# Patient Record
Sex: FEMALE | Race: ASIAN | NOT HISPANIC OR LATINO | Employment: FULL TIME | URBAN - METROPOLITAN AREA
[De-identification: names, ages, dates, MRNs, and addresses within clinical notes are randomized per-mention and may not be internally consistent; named-entity substitution may affect disease eponyms.]

---

## 2017-11-28 ENCOUNTER — TELEPHONE (OUTPATIENT)
Dept: SURGERY | Facility: CLINIC | Age: 47
End: 2017-11-28

## 2017-11-28 NOTE — TELEPHONE ENCOUNTER
"----- Message from Salome Bates LPN sent at 11/28/2017  9:49 AM CST -----      ----- Message -----  From: Vishnu Day  Sent: 11/28/2017   9:45 AM  To: Salome Bates LPN    Please schedule with General Surgery, External Hemorrhoids, Residual Hemorrhoidal Skin Tags.  11/28/2017 9:55  Left message for patient to call clinic.    11/28/2017 @ 4:12 PM  Spoke with patient she don't have insurance right know. "Patient stated She will have to wait until January 2018."  I told patient I would send her a letter to remind her to call after she gets her new insurance card.    "

## 2018-06-25 ENCOUNTER — HOSPITAL ENCOUNTER (OUTPATIENT)
Dept: RADIOLOGY | Facility: HOSPITAL | Age: 48
Discharge: HOME | End: 2018-06-25
Attending: FAMILY MEDICINE
Payer: COMMERCIAL

## 2018-06-25 ENCOUNTER — OFFICE VISIT (OUTPATIENT)
Dept: FAMILY MEDICINE | Facility: CLINIC | Age: 48
End: 2018-06-25
Payer: COMMERCIAL

## 2018-06-25 VITALS
TEMPERATURE: 98 F | HEIGHT: 64 IN | OXYGEN SATURATION: 98 % | WEIGHT: 152.56 LBS | DIASTOLIC BLOOD PRESSURE: 72 MMHG | HEART RATE: 63 BPM | BODY MASS INDEX: 26.05 KG/M2 | SYSTOLIC BLOOD PRESSURE: 108 MMHG

## 2018-06-25 DIAGNOSIS — M54.50 CHRONIC LOW BACK PAIN WITHOUT SCIATICA, UNSPECIFIED BACK PAIN LATERALITY: ICD-10-CM

## 2018-06-25 DIAGNOSIS — G89.29 CHRONIC LOW BACK PAIN WITHOUT SCIATICA, UNSPECIFIED BACK PAIN LATERALITY: ICD-10-CM

## 2018-06-25 DIAGNOSIS — Z91.09 ENVIRONMENTAL ALLERGIES: ICD-10-CM

## 2018-06-25 DIAGNOSIS — Z23 NEED FOR DIPHTHERIA-TETANUS-PERTUSSIS (TDAP) VACCINE: ICD-10-CM

## 2018-06-25 DIAGNOSIS — Z11.3 ROUTINE SCREENING FOR STI (SEXUALLY TRANSMITTED INFECTION): ICD-10-CM

## 2018-06-25 DIAGNOSIS — Z12.4 CERVICAL CANCER SCREENING: ICD-10-CM

## 2018-06-25 DIAGNOSIS — Z87.59 HISTORY OF ECTOPIC PREGNANCY: ICD-10-CM

## 2018-06-25 DIAGNOSIS — Z01.419 WELL WOMAN EXAM: Primary | ICD-10-CM

## 2018-06-25 DIAGNOSIS — Z98.890 HISTORY OF THYROID SURGERY: ICD-10-CM

## 2018-06-25 DIAGNOSIS — Z12.39 BREAST CANCER SCREENING: ICD-10-CM

## 2018-06-25 PROCEDURE — 99999 PR PBB SHADOW E&M-EST. PATIENT-LVL V: CPT | Mod: PBBFAC,,, | Performed by: FAMILY MEDICINE

## 2018-06-25 PROCEDURE — 72110 X-RAY EXAM L-2 SPINE 4/>VWS: CPT | Mod: TC,FY,PO

## 2018-06-25 PROCEDURE — 72110 X-RAY EXAM L-2 SPINE 4/>VWS: CPT | Mod: 26,,, | Performed by: RADIOLOGY

## 2018-06-25 PROCEDURE — 90471 IMMUNIZATION ADMIN: CPT | Mod: S$GLB,,, | Performed by: FAMILY MEDICINE

## 2018-06-25 PROCEDURE — 99386 PREV VISIT NEW AGE 40-64: CPT | Mod: 25,S$GLB,, | Performed by: FAMILY MEDICINE

## 2018-06-25 PROCEDURE — 90715 TDAP VACCINE 7 YRS/> IM: CPT | Mod: S$GLB,,, | Performed by: FAMILY MEDICINE

## 2018-06-25 RX ORDER — FLUTICASONE PROPIONATE 50 MCG
1 SPRAY, SUSPENSION (ML) NASAL 2 TIMES DAILY
Qty: 1 BOTTLE | Refills: 11 | Status: SHIPPED | OUTPATIENT
Start: 2018-06-25 | End: 2018-07-25

## 2018-06-25 RX ORDER — CETIRIZINE HYDROCHLORIDE 10 MG/1
10 TABLET ORAL DAILY
Refills: 0 | COMMUNITY
Start: 2018-06-25 | End: 2019-07-10

## 2018-06-25 NOTE — PATIENT INSTRUCTIONS
Tylenol 500 mg TID for pain and/or ibuprofen 400 mg TID with food  Icing  PT  XR to rule out bony issues      Low-Salt Diet  This diet removes foods that are high in salt. It also limits the amount of salt you use when cooking. It is most often used for people with high blood pressure, edema (fluid retention), and kidney, liver, or heart disease.  Table salt contains the mineral sodium. Your body needs sodium to work normally. But too much sodium can make your health problems worse. Your healthcare provider is recommending a low-salt (also called low-sodium) diet for you. Your total daily allowance of salt is 1,500 to 2,300 milligrams (mg). It is less than 1 teaspoon of table salt. This means you can have only about 500 to 700 mg of sodium at each meal. People with certain health problems should limit salt intake to the lower end of the recommended range.    When you cook, dont add much salt. If you can cook without using salt, even better. Dont add salt to your food at the table.  When shopping, read food labels. Salt is often called sodium on the label. Choose foods that are salt-free, low salt, or very low salt. Note that foods with reduced salt may not lower your salt intake enough.    Beans, potatoes, and pasta  Ok: Dry beans, split peas, lentils, potatoes, rice, macaroni, pasta, spaghetti without added salt  Avoid: Potato chips, tortilla chips, and similar products  Breads and cereals  Ok: Low-sodium breads, rolls, cereals, and cakes; low-salt crackers, matzo crackers  Avoid: Salted crackers, pretzels, popcorn, Serbian toast, pancakes, muffins  Dairy  Ok: Milk, chocolate milk, hot chocolate mix, low-salt cheeses, and yogurt  Avoid: Processed cheese and cheese spreads; Roquefort, Camembert, and cottage cheese; buttermilk, instant breakfast drink  Desserts  Ok: Ice cream, frozen yogurt, juice bars, gelatin, cookies and pies, sugar, honey, jelly, hard candy  Avoid: Most pies, cakes and cookies prepared or  processed with salt; instant pudding  Drinks  Ok: Tea, coffee, fizzy (carbonated) drinks, juices  Avoid: Flavored coffees, electrolyte replacement drinks, sports drinks  Meats  Ok: All fresh meat, fish, poultry, low-salt tuna, eggs, egg substitute  Avoid: Smoked, pickled, brine-cured, or salted meats and fish. This includes llanos, chipped beef, corned beef, hot dogs, deli meats, ham, kosher meats, salt pork, sausage, canned tuna, salted codfish, smoked salmon, herring, sardines, or anchovies.  Seasonings and spices  Ok: Most seasonings are okay. Good substitutes for salt include: fresh herb blends, hot sauce, lemon, garlic, byers, vinegar, dry mustard, parsley, cilantro, horseradish, tomato paste, regular margarine, mayonnaise, unsalted butter, cream cheese, vegetable oil, cream, low-salt salad dressing and gravy.  Avoid: Regular ketchup, relishes, pickles, soy sauce, teriyaki sauce, Worcestershire sauce, BBQ sauce, tartar sauce, meat tenderizer, chili sauce, regular gravy, regular salad dressing, salted butter  Soups  Ok: Low-salt soups and broths made with allowed foods  Avoid: Bouillon cubes, soups with smoked or salted meats, regular soup and broth  Vegetables  Ok: Most vegetables are okay; also low-salt tomato and vegetable juices  Avoid: Sauerkraut and other brine-soaked vegetables; pickles and other pickled vegetables; tomato juice, olives  Date Last Reviewed: 8/1/2016 © 2000-2017 [employer]. [address] [place] [address], [place] [address]. All rights reserved. This information is not intended as a substitute for professional medical care. Always follow your healthcare professional's instructions.        4 Steps for Eating Healthier  Changing the way you eat can improve your health. It can lower your cholesterol and blood pressure, and help you stay at a healthy weight. Your diet doesnt have to be bland and boring to be healthy. Just watch your calories and follow these steps:    1. Eat fewer  unhealthy fats  · Choose more fish and lean meats instead of fatty cuts of meat.  · Skip butter and lard, and use less margarine.  · Pass on foods that have palm, coconut, or hydrogenated oils.  · Eat fewer high-fat dairy foods like cheese, ice cream, and whole milk.  · Get a heart-healthy cookbook and try some low-fat recipes.  2. Go light on salt  · Keep the saltshaker off the table.  · Limit high-salt ingredients, such as soy sauce, bouillon, and garlic salt.  · Instead of adding salt when cooking, season your food with herbs and flavorings. Try lemon, garlic, and onion.  · Limit convenience foods, such as boxed or canned foods and restaurant food.  · Read food labels and choose lower-sodium options.  3. Limit sugar  · Pause before you add sugars to pancakes, cereal, coffee, or tea. This includes white and brown table sugar, syrup, honey, and molasses. Cut your usual amount by half.  · Use non-sugar sweeteners. Stevia, aspartame, and sucralose can satisfy a sweet tooth without adding calories.  · Swap out sugar-filled soda and other drinks. Buy sugar-free or low-calorie beverages. Remember water is always the best choice.  · Read labels and choose foods with less added sugar. Keep in mind that dairy foods and foods with fruit will have some natural sugar.  · Cut the sugar in recipes by 1/3 to 1/2. Boost the flavor with extracts like almond, vanilla, or orange. Or add spices such as cinnamon or nutmeg.  4. Eat more fiber  · Eat fresh fruits and vegetables every day.  · Boost your diet with whole grains. Go for oats, whole-grain rice, and bran.  · Add beans and lentils to your meals.  · Drink more water to match your fiber increase. This is to help prevent constipation.  Date Last Reviewed: 5/11/2015  © 1732-9135 [employer]. [address] [place] [address], [place] [address]. All rights reserved. This information is not intended as a substitute for professional medical care. Always follow your healthcare  professional's instructions.        Understanding Fat and Cholesterol  Too much cholesterol in your blood can lead to many problems such as blocked arteries. This can lead to heart attack and stroke. One of the best ways to manage heart and blood vessel disease is to lower your blood cholesterol. Planning meals that are low in saturated fat and cholesterol helps reduce the level of cholesterol in your blood. Below are eating tips to help lower your blood cholesterol levels.  Eat less fat  A healthy goal is to have less than 25% to 35% of your daily calories come from fat. Instead of fats, eat more fruits, whole-grains, and vegetables. This also helps control your weight, and can even reduce your risk for some cancers. There are different kinds of fats in foods. Fats can be saturated, unsaturated, or trans fats. The best fats to choose are unsaturated fats. But fats are high in calories, so eat even unsaturated fats sparingly.  Limit foods high in saturated fats  Saturated fats come from animals and certain plants (such as coconut and palm). Eating too much saturated fat can raise your blood cholesterol levels and make your artery problems worse. Your goal is to eat less saturated fat. Below are some examples of foods that contain lots of saturated fat:  · Fatty cuts of meat (lamb, ham, beef)  · Many pastries, cakes, cookies, and candies  · Cream, ice cream, sour cream, cheese, and butter, and foods made with them  · Sauces made with butter or cream  · Salad dressings with saturated fats  · Foods that contain palm or coconut oil  Choose unsaturated fats  Unsaturated fats are usually liquid at room temperature. They are better choices for your heart than saturated fat. There are two types of unsaturated fats: polyunsaturated fat and monounsaturated fat. Aim to replace saturated fats with polyunsaturated or monounsaturated fats.  · Polyunsaturated fats are found in corn oil, safflower oil, sunflower oil, and other  vegetable oils.  · Monounsaturated fats are found in olive oil, canola oil, and peanut oil. Some margarines and spreads are now made with these oils, too. Avocados are also high in monounsaturated fat.  Of all fats, monounsaturated fats are the least harmful to your heart.  Avoid trans fats  Like saturated fats, trans fats have been linked to heart disease. Even a small amount can harm your health. Trans fats are found in liquid oils that have been changed to be solid at room temperature. Margarine, which is often made from vegetable oil, is one example. Vegetable shortening is another. Trans fats are often found in packaged goods. Check ingredients for the words hydrogenated or partially hydrogenated. They mean the foods contain trans fat.  What about triglycerides?  Triglycerides are a type of fat in your blood. Like cholesterol, high levels of triglycerides can lead to blocked arteries. High triglyceride levels can be reduced 20% to 50%  by limiting added sugars in your diet, susbstituting healthier fats for saturated and trans fats, getting more physical activity, and losing weight if your are overweight. You may also be advised to avoid or limi alcohol.    Reading food labels  Luckily, most foods now have labels giving you the facts about what youre eating. Reading food labels helps you make healthy choices. Look for the words highlighted below.  · Serving Size. This is the amount of food in 1 serving. If you eat larger portions, be sure to count more of everything: fat, calories, and cholesterol.  · Total Fat. Tells you how many grams (g) of fat are in 1 serving.  · Calories from Fat. This tells you the total number of calories from fat in 1 serving (there are 9 calories per gram of fat). Look for foods with the fewest calories from fat.  · Saturated Fat. Tells you how many grams (g) of saturated fat are in 1 serving.  · Trans Fat. Tells how many grams (g) of trans fat are in 1  serving.  · Cholesterol. Tells you how many milligrams (mg) of cholesterol are in 1 serving.  Date Last Reviewed: 5/11/2015  © 0416-6962 [employer]. [address] [place] [address], [place] [address]. All rights reserved. This information is not intended as a substitute for professional medical care. Always follow your healthcare professional's instructions.

## 2018-06-25 NOTE — PROGRESS NOTES
Subjective:       Patient ID: Madelin Hidalgo is a 47 y.o. female.    Chief Complaint: Annual Exam    Madelin Hidalgo is a 47 y.o. female who presents today to establish care. She goes by Christi.     She has a history of a possible thyroid procedure, but she's not sure what exactly was done. She has some chronic low back pain. Worse with lifting and bending. No trauma.     Diet: She mostly eats chinese food. This is not fried or oily usually.   Exercise: She doesn't exercise    Tdap: ordered   Labs: ordered  Mammogram: ordered  Pap: prefers Gyn  STI Screening: ordered    PMHx: reviewed in EMR and updated  Meds: reviewed in EMR and updated  Shx: reviewed in EMR and updated  FMHx: no family history of colon cancer, breast cancer, ovarian cancer that she is aware of.   Social: She lives with her  and two children. Her  is also a patient of mine. There are no pets at home. She works at a Restaurant.       Review of Systems   Constitutional: Negative for chills and fever.   Respiratory: Negative for chest tightness and shortness of breath.    Cardiovascular: Negative for chest pain, palpitations and leg swelling.   Gastrointestinal: Negative for constipation, diarrhea, nausea and vomiting.   Musculoskeletal: Positive for back pain.   Skin: Negative for rash and wound.         Health Maintenance Due   Topic Date Due    Lipid Panel  1970    TETANUS VACCINE  12/23/1988    Pap Smear with HPV Cotest  12/23/1991    Mammogram  12/23/2010       Objective:     Vitals:    06/25/18 0823   BP: 108/72   Pulse: 63   Temp: 97.7 °F (36.5 °C)        Physical Exam   Constitutional: She is oriented to person, place, and time. She appears well-developed and well-nourished.   HENT:   Head: Normocephalic and atraumatic.   Right Ear: Tympanic membrane, external ear and ear canal normal.   Left Ear: Tympanic membrane, external ear and ear canal normal.   Nose: Nose normal.   Mouth/Throat: Oropharynx is clear and moist and mucous  membranes are normal.   Eyes: Conjunctivae and EOM are normal. Pupils are equal, round, and reactive to light.   Neck: Normal range of motion. No thyromegaly present.   Small non midline surgical scar noted on throat, right side   Cardiovascular: Normal rate and regular rhythm.    Pulmonary/Chest: Effort normal and breath sounds normal. No respiratory distress.   Abdominal: Soft. She exhibits no distension. There is no tenderness.   Genitourinary:   Genitourinary Comments: deferred    Musculoskeletal: She exhibits no edema.   Neurological: She is alert and oriented to person, place, and time. No cranial nerve deficit or sensory deficit.   Skin: Skin is warm and dry.   Psychiatric: She has a normal mood and affect. Her speech is normal and behavior is normal. Judgment and thought content normal.   Nursing note and vitals reviewed.      Assessment:       1. Well woman exam    2. Breast cancer screening    3. Cervical cancer screening    4. Routine screening for STI (sexually transmitted infection)    5. Need for diphtheria-tetanus-pertussis (Tdap) vaccine    6. Chronic low back pain without sciatica, unspecified back pain laterality    7. Environmental allergies    8. History of ectopic pregnancy    9. History of thyroid surgery    10. BMI 26.0-26.9,adult        Plan:         Well woman exam  ?Avoid tobacco  ?Be physically active  ?Maintain a healthy weight  ?Eat a diet rich in fruits, vegetables, and whole grains, and low in saturated/trans fat  ?Limit alcohol consumption  ?Protect against sexually transmitted infections  ?Avoid excess sun  -     CBC auto differential; Future; Expected date: 06/25/2018  -     Comprehensive metabolic panel; Future; Expected date: 06/25/2018  -     Hemoglobin A1c; Future; Expected date: 06/25/2018  -     Lipid panel; Future; Expected date: 06/25/2018  -     TSH; Future; Expected date: 06/25/2018  -     Vitamin D; Future; Expected date: 06/25/2018  -     T4, free; Future; Expected date:  06/25/2018    Breast cancer screening  -     Mammo Digital Screening Bilat with Tomosynthesis with CAD; Future; Expected date: 06/25/2018    Cervical cancer screening  Desires Gyn  -     Ambulatory referral to Obstetrics / Gynecology    Routine screening for STI (sexually transmitted infection)  -     Hepatitis panel, acute; Future; Expected date: 06/25/2018  -     HIV-1 and HIV-2 antibodies; Future; Expected date: 06/25/2018  -     RPR; Future; Expected date: 06/25/2018    Need for diphtheria-tetanus-pertussis (Tdap) vaccine  -     Tdap Vaccine    Chronic low back pain without sciatica, unspecified back pain laterality  R.I.C.E  Tylenol and/or ibuprofen   PT  -     X-Ray Lumbar Spine Complete 5 View; Future; Expected date: 06/25/2018  -     Ambulatory Referral to Physical/Occupational Therapy    Environmental allergies  -     fluticasone (FLONASE) 50 mcg/actuation nasal spray; 1 spray (50 mcg total) by Each Nare route 2 (two) times daily.  Dispense: 1 Bottle; Refill: 11  -     cetirizine (ZYRTEC) 10 MG tablet; Take 1 tablet (10 mg total) by mouth once daily.; Refill: 0    History of ectopic pregnancy  Noted    History of thyroid surgery  Unclear what surgery?  Check TSH/T4 today    BMI 26.0-26.9,adult  See AVS

## 2018-06-26 DIAGNOSIS — R79.89 LOW VITAMIN D LEVEL: Primary | ICD-10-CM

## 2018-06-26 RX ORDER — ERGOCALCIFEROL 1.25 MG/1
50000 CAPSULE ORAL
Qty: 12 CAPSULE | Refills: 0 | Status: SHIPPED | OUTPATIENT
Start: 2018-06-26 | End: 2019-07-10

## 2018-07-16 ENCOUNTER — TELEPHONE (OUTPATIENT)
Dept: OBSTETRICS AND GYNECOLOGY | Facility: CLINIC | Age: 48
End: 2018-07-16

## 2018-07-16 ENCOUNTER — HOSPITAL ENCOUNTER (OUTPATIENT)
Dept: RADIOLOGY | Facility: HOSPITAL | Age: 48
Discharge: HOME | End: 2018-07-16
Attending: FAMILY MEDICINE
Payer: COMMERCIAL

## 2018-07-16 ENCOUNTER — OFFICE VISIT (OUTPATIENT)
Dept: OBSTETRICS AND GYNECOLOGY | Facility: CLINIC | Age: 48
End: 2018-07-16
Payer: COMMERCIAL

## 2018-07-16 VITALS
WEIGHT: 150.56 LBS | DIASTOLIC BLOOD PRESSURE: 74 MMHG | BODY MASS INDEX: 26.25 KG/M2 | SYSTOLIC BLOOD PRESSURE: 102 MMHG

## 2018-07-16 DIAGNOSIS — Z12.39 BREAST CANCER SCREENING: ICD-10-CM

## 2018-07-16 DIAGNOSIS — Z01.419 WELL WOMAN EXAM WITH ROUTINE GYNECOLOGICAL EXAM: Primary | ICD-10-CM

## 2018-07-16 DIAGNOSIS — Z12.4 SCREENING FOR CERVICAL CANCER: ICD-10-CM

## 2018-07-16 PROCEDURE — 88175 CYTOPATH C/V AUTO FLUID REDO: CPT

## 2018-07-16 PROCEDURE — 77067 SCR MAMMO BI INCL CAD: CPT | Mod: TC

## 2018-07-16 PROCEDURE — 87624 HPV HI-RISK TYP POOLED RSLT: CPT

## 2018-07-16 PROCEDURE — 77063 BREAST TOMOSYNTHESIS BI: CPT | Mod: 26,,, | Performed by: RADIOLOGY

## 2018-07-16 PROCEDURE — 99999 PR PBB SHADOW E&M-EST. PATIENT-LVL III: CPT | Mod: PBBFAC,,, | Performed by: OBSTETRICS & GYNECOLOGY

## 2018-07-16 PROCEDURE — 99386 PREV VISIT NEW AGE 40-64: CPT | Mod: S$GLB,,, | Performed by: OBSTETRICS & GYNECOLOGY

## 2018-07-16 PROCEDURE — 77067 SCR MAMMO BI INCL CAD: CPT | Mod: 26,,, | Performed by: RADIOLOGY

## 2018-07-16 NOTE — TELEPHONE ENCOUNTER
arrived as patient was walking into the waiting room to leave being that her appt was completed. Pt was roomed and examined using the Language Line the entire visit.

## 2018-07-16 NOTE — PROGRESS NOTES
GYNECOLOGY OFFICE NOTE    Reason for visit: annual    HPI: Pt is a 47 y.o.  female  who presents for annual. Cycle: menarche- 15, Interval-  Q month, Duration- 7 days, Flow- normal, denies dysmenorrhea. She is sexually active.  She uses no method for contraception.  She does not desire STI screening. She denies vaginal discharge.  Last pap: 2-3yrs, denies hx of abnormal. Last MMG scheduled for today.     Past Medical History:   Diagnosis Date    Ectopic pregnancy        Past Surgical History:   Procedure Laterality Date    THYROID SURGERY      unclear what kind, she is unsure       Family History   Problem Relation Age of Onset    Hypertension Mother     No Known Problems Father     Esophageal cancer Paternal Grandfather     Breast cancer Neg Hx     Colon cancer Neg Hx     Ovarian cancer Neg Hx        Social History   Substance Use Topics    Smoking status: Never Smoker    Smokeless tobacco: Never Used    Alcohol use No       OB History    Para Term  AB Living   3 2     1 2   SAB TAB Ectopic Multiple Live Births       1   2      # Outcome Date GA Lbr Mendoza/2nd Weight Sex Delivery Anes PTL Lv   3 Ectopic            2 Para      Vag-Spont   ALMA   1 Para      Vag-Spont   ALMA          Current Outpatient Prescriptions   Medication Sig    cetirizine (ZYRTEC) 10 MG tablet Take 1 tablet (10 mg total) by mouth once daily.    ergocalciferol (ERGOCALCIFEROL) 50,000 unit Cap Take 1 capsule (50,000 Units total) by mouth every 7 days.    fluticasone (FLONASE) 50 mcg/actuation nasal spray 1 spray (50 mcg total) by Each Nare route 2 (two) times daily.     No current facility-administered medications for this visit.        Allergies: Patient has no known allergies.     /74   Wt 68.3 kg (150 lb 9.2 oz)   LMP 2018 (Exact Date)   BMI 26.25 kg/m²     ROS:  GENERAL: Denies fever or chills.   SKIN: Denies rash or lesions.   HEAD: Denies head injury or headache.   CHEST: Denies chest  pain or shortness of breath.   CARDIOVASCULAR: Denies palpitations or chest pain.   ABDOMEN: No abdominal pain, constipation, diarrhea, nausea, vomiting or rectal bleeding.   URINARY: No dysuria, hematuria, or burning on urination.  REPRODUCTIVE: See HPI.   BREASTS: see HPI  NEUROLOGIC: Denies syncope or weakness.     Physical Exam:  GENERAL: alert, appears stated age and cooperative  NEUROLOGIC: orientated to person, place and time, normal mood and affect   CHEST: Normal respiratory effort  NECK: normal appearance, no thyromegaly masses or tenderness  SKIN: no acne, striae, hirsutism  BREAST EXAM: breasts appear normal, no suspicious masses, no skin or nipple changes or axillary nodes  ABDOMEN: abdomen is soft without significant tenderness, masses, organomegaly or guarding, no hernias noted  EXTERNAL GENITALIA:  normal general appearance  URETHRA: normal urethra, normal urethral meatus  VAGINA:  normal mucosa without prolapse or lesions  CERVIX:  Normal  UTERUS:  normal size, mobile, non-tender, normal shape and consistency  ADNEXA:  normal adnexa in size, nontender and no masses    Diagnosis:  1. Well woman exam with routine gynecological exam    2. Screening for cervical cancer        Plan:   1. Annual  2. Pap/hpv    Orders Placed This Encounter    HPV High Risk Genotypes, PCR    Liquid-based pap smear, screening       Patient was counseled today on the new ACS guidelines for cervical cytology screening as well as the current recommendations for breast cancer screening. She was counseled to follow up with her PCP for other routine health maintenance.     Follow-up in about 1 year (around 7/16/2019) for annual.      Tyra Andrew MD  OB/GYN  Pager: [phone]

## 2018-07-16 NOTE — LETTER
July 16, 2018      Hudson Serna, DO  [address]  [place] [address]           [place] [facility]  [address] [facility], [address]  5th [address] [facility]  [place] [address]  Phone: [phone]          Patient: Madelin Hidalgo   MR Number: [id number]   YOB: 1970   Date of Visit: 7/16/2018       Dear Dr. Hudson Serna:    Thank you for referring Madelin Hidalgo to me for evaluation. Attached you will find relevant portions of my assessment and plan of care.    If you have questions, please do not hesitate to call me. I look forward to following Madelin Hidalgo along with you.    Sincerely,    Tyra Andrew MD    Enclosure  CC:  No Recipients    If you would like to receive this communication electronically, please contact [email or web address] or [phone] to request more information on [employer] Link access.    For providers and/or their staff who would like to refer a patient to [facility], please contact us through our one-stop-shop provider referral line, [facility] , at [phone].    If you feel you have received this communication in error or would no longer like to receive these types of communications, please e-mail [email or web address]

## 2018-07-16 NOTE — TELEPHONE ENCOUNTER
----- Message from Merle Suh sent at 7/16/2018  8:13 AM CDT -----  Patient's  just called she is [place] late [place]. Please advise.

## 2018-07-20 LAB
HPV HR 12 DNA CVX QL NAA+PROBE: NEGATIVE
HPV16 AG SPEC QL: NEGATIVE
HPV18 DNA SPEC QL NAA+PROBE: NEGATIVE

## 2019-07-10 ENCOUNTER — LAB VISIT (OUTPATIENT)
Dept: LAB | Facility: HOSPITAL | Age: 49
End: 2019-07-10
Attending: FAMILY MEDICINE
Payer: COMMERCIAL

## 2019-07-10 ENCOUNTER — OFFICE VISIT (OUTPATIENT)
Dept: FAMILY MEDICINE | Facility: CLINIC | Age: 49
End: 2019-07-10
Payer: COMMERCIAL

## 2019-07-10 VITALS
WEIGHT: 153.44 LBS | TEMPERATURE: 98 F | OXYGEN SATURATION: 99 % | HEART RATE: 66 BPM | SYSTOLIC BLOOD PRESSURE: 118 MMHG | DIASTOLIC BLOOD PRESSURE: 66 MMHG | BODY MASS INDEX: 26.2 KG/M2 | HEIGHT: 64 IN

## 2019-07-10 DIAGNOSIS — K64.8 OTHER HEMORRHOIDS: ICD-10-CM

## 2019-07-10 DIAGNOSIS — G89.29 CHRONIC BILATERAL LOW BACK PAIN WITHOUT SCIATICA: ICD-10-CM

## 2019-07-10 DIAGNOSIS — M54.50 CHRONIC BILATERAL LOW BACK PAIN WITHOUT SCIATICA: ICD-10-CM

## 2019-07-10 DIAGNOSIS — Z12.39 SCREENING FOR MALIGNANT NEOPLASM OF BREAST: ICD-10-CM

## 2019-07-10 DIAGNOSIS — Z01.419 WELL WOMAN EXAM: Primary | ICD-10-CM

## 2019-07-10 DIAGNOSIS — Z01.419 WELL WOMAN EXAM: ICD-10-CM

## 2019-07-10 LAB
25(OH)D3+25(OH)D2 SERPL-MCNC: 16 NG/ML (ref 30–96)
ALBUMIN SERPL BCP-MCNC: 3.9 G/DL (ref 3.5–5.2)
ALP SERPL-CCNC: 76 U/L (ref 55–135)
ALT SERPL W/O P-5'-P-CCNC: 9 U/L (ref 10–44)
ANION GAP SERPL CALC-SCNC: 11 MMOL/L (ref 8–16)
AST SERPL-CCNC: 13 U/L (ref 10–40)
BASOPHILS # BLD AUTO: 0.02 K/UL (ref 0–0.2)
BASOPHILS NFR BLD: 0.3 % (ref 0–1.9)
BILIRUB SERPL-MCNC: 0.4 MG/DL (ref 0.1–1)
BUN SERPL-MCNC: 13 MG/DL (ref 6–20)
CALCIUM SERPL-MCNC: 9.3 MG/DL (ref 8.7–10.5)
CHLORIDE SERPL-SCNC: 106 MMOL/L (ref 95–110)
CHOLEST SERPL-MCNC: 187 MG/DL (ref 120–199)
CHOLEST/HDLC SERPL: 3.1 {RATIO} (ref 2–5)
CO2 SERPL-SCNC: 23 MMOL/L (ref 23–29)
CREAT SERPL-MCNC: 0.6 MG/DL (ref 0.5–1.4)
DIFFERENTIAL METHOD: ABNORMAL
EOSINOPHIL # BLD AUTO: 0.1 K/UL (ref 0–0.5)
EOSINOPHIL NFR BLD: 1.1 % (ref 0–8)
ERYTHROCYTE [DISTWIDTH] IN BLOOD BY AUTOMATED COUNT: 14.6 % (ref 11.5–14.5)
EST. GFR  (AFRICAN AMERICAN): >60 ML/MIN/1.73 M^2
EST. GFR  (NON AFRICAN AMERICAN): >60 ML/MIN/1.73 M^2
ESTIMATED AVG GLUCOSE: 114 MG/DL (ref 68–131)
GLUCOSE SERPL-MCNC: 87 MG/DL (ref 70–110)
HBA1C MFR BLD HPLC: 5.6 % (ref 4–5.6)
HCT VFR BLD AUTO: 35.8 % (ref 37–48.5)
HDLC SERPL-MCNC: 61 MG/DL (ref 40–75)
HDLC SERPL: 32.6 % (ref 20–50)
HGB BLD-MCNC: 10.8 G/DL (ref 12–16)
IMM GRANULOCYTES # BLD AUTO: 0.02 K/UL (ref 0–0.04)
IMM GRANULOCYTES NFR BLD AUTO: 0.3 % (ref 0–0.5)
LDLC SERPL CALC-MCNC: 98.2 MG/DL (ref 63–159)
LYMPHOCYTES # BLD AUTO: 2.2 K/UL (ref 1–4.8)
LYMPHOCYTES NFR BLD: 29.6 % (ref 18–48)
MCH RBC QN AUTO: 27.5 PG (ref 27–31)
MCHC RBC AUTO-ENTMCNC: 30.2 G/DL (ref 32–36)
MCV RBC AUTO: 91 FL (ref 82–98)
MONOCYTES # BLD AUTO: 0.6 K/UL (ref 0.3–1)
MONOCYTES NFR BLD: 8 % (ref 4–15)
NEUTROPHILS # BLD AUTO: 4.5 K/UL (ref 1.8–7.7)
NEUTROPHILS NFR BLD: 60.7 % (ref 38–73)
NONHDLC SERPL-MCNC: 126 MG/DL
NRBC BLD-RTO: 0 /100 WBC
PLATELET # BLD AUTO: 306 K/UL (ref 150–350)
PMV BLD AUTO: 10.5 FL (ref 9.2–12.9)
POTASSIUM SERPL-SCNC: 4 MMOL/L (ref 3.5–5.1)
PROT SERPL-MCNC: 7.7 G/DL (ref 6–8.4)
RBC # BLD AUTO: 3.93 M/UL (ref 4–5.4)
SODIUM SERPL-SCNC: 140 MMOL/L (ref 136–145)
TRIGL SERPL-MCNC: 139 MG/DL (ref 30–150)
TSH SERPL DL<=0.005 MIU/L-ACNC: 3.64 UIU/ML (ref 0.4–4)
WBC # BLD AUTO: 7.4 K/UL (ref 3.9–12.7)

## 2019-07-10 PROCEDURE — 80053 COMPREHEN METABOLIC PANEL: CPT

## 2019-07-10 PROCEDURE — 84443 ASSAY THYROID STIM HORMONE: CPT

## 2019-07-10 PROCEDURE — 82306 VITAMIN D 25 HYDROXY: CPT

## 2019-07-10 PROCEDURE — 80061 LIPID PANEL: CPT

## 2019-07-10 PROCEDURE — 99396 PR PREVENTIVE VISIT,EST,40-64: ICD-10-PCS | Mod: S$GLB,,, | Performed by: FAMILY MEDICINE

## 2019-07-10 PROCEDURE — 85025 COMPLETE CBC W/AUTO DIFF WBC: CPT

## 2019-07-10 PROCEDURE — 36415 COLL VENOUS BLD VENIPUNCTURE: CPT | Mod: PO

## 2019-07-10 PROCEDURE — 99999 PR PBB SHADOW E&M-EST. PATIENT-LVL V: ICD-10-PCS | Mod: PBBFAC,,, | Performed by: FAMILY MEDICINE

## 2019-07-10 PROCEDURE — 83036 HEMOGLOBIN GLYCOSYLATED A1C: CPT

## 2019-07-10 PROCEDURE — 99999 PR PBB SHADOW E&M-EST. PATIENT-LVL V: CPT | Mod: PBBFAC,,, | Performed by: FAMILY MEDICINE

## 2019-07-10 PROCEDURE — 99396 PREV VISIT EST AGE 40-64: CPT | Mod: S$GLB,,, | Performed by: FAMILY MEDICINE

## 2019-07-10 RX ORDER — DICLOFENAC SODIUM 10 MG/G
2 GEL TOPICAL 4 TIMES DAILY
Qty: 100 G | Refills: 0 | Status: SHIPPED | OUTPATIENT
Start: 2019-07-10 | End: 2021-11-03

## 2019-07-10 RX ORDER — IBUPROFEN 600 MG/1
600 TABLET ORAL
Qty: 30 TABLET | Refills: 0 | Status: SHIPPED | OUTPATIENT
Start: 2019-07-10 | End: 2019-07-20

## 2019-07-10 RX ORDER — HYDROCORTISONE 25 MG/G
CREAM TOPICAL 2 TIMES DAILY
Qty: 30 G | Refills: 0 | Status: SHIPPED | OUTPATIENT
Start: 2019-07-10 | End: 2021-12-20

## 2019-07-10 NOTE — PATIENT INSTRUCTIONS
?Avoid tobacco  ?Be physically active  ?Maintain a healthy weight  ?Eat a diet rich in fruits, vegetables, and whole grains, and low in saturated/trans fat  ?Limit alcohol consumption  ?Protect against sexually transmitted infections  ?Avoid excess sun  RTC as directed during the visit, as needed or in 1 year for a physical with labs prior. Call one month prior to the physical to schedule apt and labs.     Discussed the diagnosis with the patient, including plans for treatment and expected course.  Distributed educational material.  Discussed symptomatic and preventative measures regarding hemorrhoidal disease.  Recommended fiber supplementation and increased water intake  Can consider either psyllium or methylcellulose  Can try peaches, prunes, pears, plums, oat meal.  Patients should refrain from straining or lingering (eg, reading) on the toilet.  Patients should have regular physical exercise.  If possible, patients should avoid medications that can cause constipation  Topical corticosteroid per medication orders.  Sitz baths -- Sitz baths are an intuitive topical treatment for acute flare-ups of hemorrhoids to reduce inflammation and edema and relax the sphincter muscles  Follow up with colorectal surgery in 4 weeks or as needed.  Follow up as needed.  Advised to seek care immediately if bleeding becomes heavy.

## 2019-07-10 NOTE — PROGRESS NOTES
Subjective:       Patient ID: Madelin Hidalgo is a 48 y.o. female.    Chief Complaint: Annual Exam    Madelin Hidalgo is a 47 y.o. female who presents today to establish care. She goes by Christi.      Diet: She mostly eats chinese food. 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 This is not fried or oily usually. No soda. She drinks juice and sweet tea occasionally.   Exercise: she doesn't exercise.      She has a history of a possible thyroid procedure, but she's not sure what exactly was done. She has a history of hemorrhoids. She was given proctomed 2.5%. She has had symptoms for 3 years. She has pain over the last few days. There is some bleeding when she wipes occasionally; last time was one month ago. The BM are soft. Declines exam today.  She has some chronic low back pain. Worse with lifting and bending. No trauma. XR last year showed DJD.     Tdap: UTD  Labs: ordered  Mammogram: Had long discussion regarding risks and benefits of screening mammography. Explained that they can be started at 40 or 50 and repeated every 1 or 2 years. Patient desires mammogram today. Ordered.     Pap: sees Gyn     PMHx: reviewed in EMR and updated  Meds: reviewed in EMR and updated  Shx: reviewed in EMR and updated  FMHx: no family history of colon cancer, breast cancer, ovarian cancer that she is aware of.   Social: She lives with her  and two children. Her  is also a patient of mine. There are no pets at home. She works at a Restaurant.     [employer] used for [employer] services.     Review of Systems   Constitutional: Negative for chills and fever.   Respiratory: Negative for chest tightness and shortness of breath.    Cardiovascular: Negative for chest pain, palpitations and leg swelling.   Gastrointestinal: Positive for anal bleeding. Negative for constipation, diarrhea, nausea and vomiting.   Musculoskeletal: Positive for back pain.   Skin: Negative for rash and wound.   Neurological: Negative for  dizziness, light-headedness, numbness and headaches.       Immunization History   Administered Date(s) Administered    Tdap 06/25/2018       Objective:     Vitals:    07/10/19 0813   BP: 118/66   Pulse: 66   Temp: 98 °F (36.7 °C)        Physical Exam   Constitutional: She is oriented to person, place, and time. She appears well-developed and well-nourished.   HENT:   Head: Normocephalic and atraumatic.   Right Ear: Tympanic membrane, external ear and ear canal normal.   Left Ear: Tympanic membrane, external ear and ear canal normal.   Nose: Nose normal.   Mouth/Throat: Oropharynx is clear and moist and mucous membranes are normal.   Eyes: Pupils are equal, round, and reactive to light. Conjunctivae and EOM are normal.   Neck: Normal range of motion. No thyromegaly present.   Small non midline surgical scar noted on throat, right side   Cardiovascular: Normal rate and regular rhythm.   Pulmonary/Chest: Effort normal and breath sounds normal. No respiratory distress.   Abdominal: Soft. She exhibits no distension. There is no tenderness.   Genitourinary:   Genitourinary Comments: deferred    Musculoskeletal: She exhibits no edema.   Neurological: She is alert and oriented to person, place, and time. No cranial nerve deficit or sensory deficit.   Skin: Skin is warm and dry.   Psychiatric: She has a normal mood and affect. Her speech is normal and behavior is normal. Judgment and thought content normal.   Nursing note and vitals reviewed.      Assessment:       1. Well woman exam    2. Screening for malignant neoplasm of breast    3. Other hemorrhoids    4. BMI 26.0-26.9,adult    5. Chronic bilateral low back pain without sciatica        Plan:         Well woman exam  ?Avoid tobacco  ?Be physically active  ?Maintain a healthy weight  ?Eat a diet rich in fruits, vegetables, and whole grains, and low in saturated/trans fat  ?Limit alcohol consumption  ?Protect against sexually transmitted infections  ?Avoid excess  sun  RTC as directed during the visit, as needed or in 1 year for a physical with labs prior. Call one month prior to the physical to schedule apt and labs.   -     CBC auto differential; Future; Expected date: 07/10/2019  -     Comprehensive metabolic panel; Future; Expected date: 07/10/2019  -     Hemoglobin A1c; Future; Expected date: 07/10/2019  -     Lipid panel; Future; Expected date: 07/10/2019  -     TSH; Future; Expected date: 07/10/2019  -     Vitamin D; Future; Expected date: 07/10/2019    Screening for malignant neoplasm of breast  -     Mammo Digital Screening Bilat; Future; Expected date: 07/10/2019    Other hemorrhoids  Declined exam today  Follow up with colorectal surgery in 4 weeks or as needed.  -     hydrocortisone (ANUSOL-HC) 2.5 % rectal cream; Place rectally 2 (two) times daily.  Dispense: 30 g; Refill: 0  -     Ambulatory referral to Colorectal Surgery    BMI 26.0-26.9,adult  See AVS    Chronic bilateral low back pain without sciatica  -     ibuprofen (ADVIL,MOTRIN) 600 MG tablet; Take 1 tablet (600 mg total) by mouth 3 (three) times daily with meals. for 10 days  Dispense: 30 tablet; Refill: 0  -     diclofenac sodium (VOLTAREN) 1 % Gel; Apply 2 g topically 4 (four) times daily.  Dispense: 100 g; Refill: 0  -     Ambulatory Referral to Physical/Occupational Therapy

## 2019-07-11 DIAGNOSIS — R79.89 LOW VITAMIN D LEVEL: Primary | ICD-10-CM

## 2019-07-11 DIAGNOSIS — D64.9 NORMOCYTIC ANEMIA: ICD-10-CM

## 2019-07-11 RX ORDER — ERGOCALCIFEROL 1.25 MG/1
50000 CAPSULE ORAL
Qty: 12 CAPSULE | Refills: 0 | Status: SHIPPED | OUTPATIENT
Start: 2019-07-11 | End: 2021-11-03

## 2019-07-12 ENCOUNTER — TELEPHONE (OUTPATIENT)
Dept: FAMILY MEDICINE | Facility: CLINIC | Age: 49
End: 2019-07-12

## 2019-07-12 ENCOUNTER — TELEPHONE (OUTPATIENT)
Dept: ADMINISTRATIVE | Facility: OTHER | Age: 49
End: 2019-07-12

## 2019-07-12 NOTE — TELEPHONE ENCOUNTER
Inform patient of lab results such as her anemia to repeat blood work in 3 months also to take vitamin D supplement and after buy over the counter vitamin D 1000 iu once a day and other labs were normal. Patient voices understanding.

## 2019-07-12 NOTE — TELEPHONE ENCOUNTER
----- Message from Hudson Serna DO sent at 7/11/2019  8:16 AM CDT -----  Anemia is noted. This might be due to her period and/or her hemorrhoids.   Recheck in 3 months please.     You have low vitamin D and a Rx has been sent to your pharmacy. Take this pill once a week and when the prescription and refills are done, start taking an OTC vitamin D supplementation at 1000 IU daily.     Cholesterol is better.     Other labs are normal.

## 2019-07-23 ENCOUNTER — HOSPITAL ENCOUNTER (OUTPATIENT)
Dept: RADIOLOGY | Facility: HOSPITAL | Age: 49
Discharge: HOME | End: 2019-07-23
Attending: FAMILY MEDICINE
Payer: COMMERCIAL

## 2019-07-23 DIAGNOSIS — Z12.39 SCREENING FOR MALIGNANT NEOPLASM OF BREAST: ICD-10-CM

## 2019-07-23 PROCEDURE — 77067 MAMMO DIGITAL SCREENING BILAT WITH TOMOSYNTHESIS_CAD: ICD-10-PCS | Mod: 26,,, | Performed by: RADIOLOGY

## 2019-07-23 PROCEDURE — 77063 MAMMO DIGITAL SCREENING BILAT WITH TOMOSYNTHESIS_CAD: ICD-10-PCS | Mod: 26,,, | Performed by: RADIOLOGY

## 2019-07-23 PROCEDURE — 77067 SCR MAMMO BI INCL CAD: CPT | Mod: 26,,, | Performed by: RADIOLOGY

## 2019-07-23 PROCEDURE — 77067 SCR MAMMO BI INCL CAD: CPT | Mod: TC

## 2019-07-23 PROCEDURE — 77063 BREAST TOMOSYNTHESIS BI: CPT | Mod: 26,,, | Performed by: RADIOLOGY

## 2019-10-09 ENCOUNTER — LAB VISIT (OUTPATIENT)
Dept: LAB | Facility: HOSPITAL | Age: 49
End: 2019-10-09
Attending: FAMILY MEDICINE
Payer: COMMERCIAL

## 2019-10-09 DIAGNOSIS — D64.9 NORMOCYTIC ANEMIA: ICD-10-CM

## 2019-10-09 LAB
BASOPHILS # BLD AUTO: 0.03 K/UL (ref 0–0.2)
BASOPHILS NFR BLD: 0.3 % (ref 0–1.9)
DIFFERENTIAL METHOD: ABNORMAL
EOSINOPHIL # BLD AUTO: 0.1 K/UL (ref 0–0.5)
EOSINOPHIL NFR BLD: 1.3 % (ref 0–8)
ERYTHROCYTE [DISTWIDTH] IN BLOOD BY AUTOMATED COUNT: 14.6 % (ref 11.5–14.5)
FERRITIN SERPL-MCNC: 3 NG/ML (ref 20–300)
HCT VFR BLD AUTO: 34.3 % (ref 37–48.5)
HGB BLD-MCNC: 10.2 G/DL (ref 12–16)
IMM GRANULOCYTES # BLD AUTO: 0.04 K/UL (ref 0–0.04)
IMM GRANULOCYTES NFR BLD AUTO: 0.4 % (ref 0–0.5)
IRON SERPL-MCNC: 19 UG/DL (ref 30–160)
LYMPHOCYTES # BLD AUTO: 3.2 K/UL (ref 1–4.8)
LYMPHOCYTES NFR BLD: 35.1 % (ref 18–48)
MCH RBC QN AUTO: 26.5 PG (ref 27–31)
MCHC RBC AUTO-ENTMCNC: 29.7 G/DL (ref 32–36)
MCV RBC AUTO: 89 FL (ref 82–98)
MONOCYTES # BLD AUTO: 0.7 K/UL (ref 0.3–1)
MONOCYTES NFR BLD: 7.7 % (ref 4–15)
NEUTROPHILS # BLD AUTO: 5.1 K/UL (ref 1.8–7.7)
NEUTROPHILS NFR BLD: 55.2 % (ref 38–73)
NRBC BLD-RTO: 0 /100 WBC
PLATELET # BLD AUTO: 335 K/UL (ref 150–350)
PMV BLD AUTO: 10.3 FL (ref 9.2–12.9)
RBC # BLD AUTO: 3.85 M/UL (ref 4–5.4)
SATURATED IRON: 4 % (ref 20–50)
TOTAL IRON BINDING CAPACITY: 488 UG/DL (ref 250–450)
TRANSFERRIN SERPL-MCNC: 330 MG/DL (ref 200–375)
WBC # BLD AUTO: 9.2 K/UL (ref 3.9–12.7)

## 2019-10-09 PROCEDURE — 36415 COLL VENOUS BLD VENIPUNCTURE: CPT | Mod: PO

## 2019-10-09 PROCEDURE — 85025 COMPLETE CBC W/AUTO DIFF WBC: CPT

## 2019-10-09 PROCEDURE — 83540 ASSAY OF IRON: CPT

## 2019-10-09 PROCEDURE — 82728 ASSAY OF FERRITIN: CPT

## 2019-10-10 ENCOUNTER — TELEPHONE (OUTPATIENT)
Dept: FAMILY MEDICINE | Facility: CLINIC | Age: 49
End: 2019-10-10

## 2019-10-10 DIAGNOSIS — D50.8 OTHER IRON DEFICIENCY ANEMIA: Primary | ICD-10-CM

## 2019-10-10 RX ORDER — FERROUS SULFATE 325(65) MG
325 TABLET ORAL 2 TIMES DAILY
Qty: 180 TABLET | Refills: 0 | Status: SHIPPED | OUTPATIENT
Start: 2019-10-10 | End: 2019-11-15

## 2019-10-10 NOTE — TELEPHONE ENCOUNTER
Called with the assistance of the  line.     She has low iron; this was discussed with her. She is not having melena or bloody stools.     She is not having any change in menses bleeding.     No bleeding with hemorrhoids.     No easy bruising or bleeding    Referred to hematology. Discussed results in detail    She reports that Monday mornings are the best time for her.       To contact The Language Line:  Dial [phone]  Provide 6 digit Client ID [id number]  Company Name: [facility]  Department Code: Cost Center Number.

## 2019-10-11 ENCOUNTER — TELEPHONE (OUTPATIENT)
Dept: HEMATOLOGY/ONCOLOGY | Facility: CLINIC | Age: 49
End: 2019-10-11

## 2019-10-11 DIAGNOSIS — D50.0 IRON DEFICIENCY ANEMIA DUE TO CHRONIC BLOOD LOSS: ICD-10-CM

## 2019-10-11 NOTE — TELEPHONE ENCOUNTER
Language line  assisted with interpreting for pt. Via phone call. Pt. Confirmed appt. With Dr. Meyer at 8:00am on 10/14/19.

## 2019-10-14 ENCOUNTER — INITIAL CONSULT (OUTPATIENT)
Dept: HEMATOLOGY/ONCOLOGY | Facility: CLINIC | Age: 49
End: 2019-10-14
Payer: COMMERCIAL

## 2019-10-14 VITALS
RESPIRATION RATE: 18 BRPM | WEIGHT: 153.88 LBS | TEMPERATURE: 98 F | BODY MASS INDEX: 26.83 KG/M2 | SYSTOLIC BLOOD PRESSURE: 111 MMHG | HEART RATE: 65 BPM | DIASTOLIC BLOOD PRESSURE: 72 MMHG | OXYGEN SATURATION: 99 %

## 2019-10-14 DIAGNOSIS — Z71.89 ADVANCE CARE PLANNING: ICD-10-CM

## 2019-10-14 DIAGNOSIS — D50.0 IRON DEFICIENCY ANEMIA DUE TO CHRONIC BLOOD LOSS: Primary | ICD-10-CM

## 2019-10-14 PROCEDURE — 99999 PR PBB SHADOW E&M-EST. PATIENT-LVL III: CPT | Mod: PBBFAC,,, | Performed by: INTERNAL MEDICINE

## 2019-10-14 PROCEDURE — 99497 ADVNCD CARE PLAN 30 MIN: CPT | Mod: S$GLB,,, | Performed by: INTERNAL MEDICINE

## 2019-10-14 PROCEDURE — 99204 PR OFFICE/OUTPT VISIT, NEW, LEVL IV, 45-59 MIN: ICD-10-PCS | Mod: S$GLB,,, | Performed by: INTERNAL MEDICINE

## 2019-10-14 PROCEDURE — 3008F PR BODY MASS INDEX (BMI) DOCUMENTED: ICD-10-PCS | Mod: CPTII,S$GLB,, | Performed by: INTERNAL MEDICINE

## 2019-10-14 PROCEDURE — 99999 PR PBB SHADOW E&M-EST. PATIENT-LVL III: ICD-10-PCS | Mod: PBBFAC,,, | Performed by: INTERNAL MEDICINE

## 2019-10-14 PROCEDURE — 99497 PR ADVNCD CARE PLAN 30 MIN: ICD-10-PCS | Mod: S$GLB,,, | Performed by: INTERNAL MEDICINE

## 2019-10-14 PROCEDURE — 99204 OFFICE O/P NEW MOD 45 MIN: CPT | Mod: S$GLB,,, | Performed by: INTERNAL MEDICINE

## 2019-10-14 PROCEDURE — 3008F BODY MASS INDEX DOCD: CPT | Mod: CPTII,S$GLB,, | Performed by: INTERNAL MEDICINE

## 2019-10-14 NOTE — LETTER
October 14, 2019      Hudson Serna, DO  [address] [facility]  [place] [address]           [place] [facility]  [address] [facility] [address]  [place] [address]  Phone: [phone]          Patient: Madelin Hidalgo   MR Number: [id number]   YOB: 1970   Date of Visit: 10/14/2019       Dear :    Thank you for referring Madelin Hidalgo to me for evaluation. Attached you will find relevant portions of my assessment and plan of care.    If you have questions, please do not hesitate to call me. I look forward to following Madelin Hidalgo along with you.    Sincerely,    Shad Meyer MD    Enclosure  CC:  No Recipients    If you would like to receive this communication electronically, please contact [email or web address] or [phone] to request more information on [facility] Link access.    For providers and/or their staff who would like to refer a patient to [facility], please contact us through our one-stop-shop provider referral line, Heike Rubalcava, at [phone].    If you feel you have received this communication in error or would no longer like to receive these types of communications, please e-mail [email or web address]

## 2019-10-14 NOTE — PROGRESS NOTES
PATIENT: Madelin Hidalgo  MRN: [id number]  DATE: 10/14/2019    Diagnosis:   1. Iron deficiency anemia due to chronic blood loss    2. Advance care planning      Chief Complaint: iron deficiency anemia      Subjective:    History of Present Illness: Ms. Hidalgo is a 48 y.o. female who presents for evaluation and management of iron deficiency anemia. She is referred by Dr. Serna.    [I used an  for this visit.]    - on 10/9/19, she had a normocytic anemia with decreased ferritin/saturated iron/iron and increased total iron binding capacity.   - today, she is doing well. She notes fatigue when she does not get enough sleep. She denies bleeding, ice eating, dyspnea upon exertion. She denies shortness of breath, chest pain, nausea, vomiting, diarrhea, constipation.  - she denies menorrhagia. Her menstrual cycles are regular and have not worsened. She has not had an upper GI endoscopy or colonoscopy. She has a history of hemorrhoids.    Past medical, surgical, family, and social histories have been reviewed and updated below.    Past Medical History:   Past Medical History:   Diagnosis Date    Ectopic pregnancy        Past Surgical History:   Past Surgical History:   Procedure Laterality Date    THYROID SURGERY      unclear what kind, she is unsure       Family History:   Family History   Problem Relation Age of Onset    Hypertension Mother     No Known Problems Father     Esophageal cancer Paternal Grandfather     No Known Problems Sister     No Known Problems Brother     No Known Problems Brother     No Known Problems Sister     Breast cancer Neg Hx     Colon cancer Neg Hx     Ovarian cancer Neg Hx        Social History:  reports that she has never smoked. She has never used smokeless tobacco. She reports that she does not drink alcohol or use drugs.    Allergies:  Review of patient's allergies indicates:  No Known Allergies    Medications:  Current Outpatient Medications   Medication Sig Dispense Refill     diclofenac sodium (VOLTAREN) 1 % Gel Apply 2 g topically 4 (four) times daily. 100 g 0    ergocalciferol (ERGOCALCIFEROL) 50,000 unit Cap Take 1 capsule (50,000 Units total) by mouth every 7 days. Then start daily OTC replacement after this Rx is complete 12 capsule 0    ferrous sulfate (FEOSOL) 325 mg (65 mg iron) Tab tablet Take 1 tablet (325 mg total) by mouth 2 (two) times daily. 180 tablet 0    hydrocortisone (ANUSOL-HC) 2.5 % rectal cream Place rectally 2 (two) times daily. 30 g 0     No current facility-administered medications for this visit.        Review of Systems   Constitutional: Positive for fatigue.   HENT: Negative for sore throat.    Eyes: Negative for visual disturbance.   Respiratory: Negative for cough and shortness of breath.    Cardiovascular: Negative for chest pain.   Gastrointestinal: Negative for abdominal pain, constipation, diarrhea, nausea and vomiting.   Genitourinary: Negative for dysuria.   Musculoskeletal: Negative for back pain.   Skin: Negative for rash.   Neurological: Negative for headaches.   Hematological: Negative for adenopathy.   Psychiatric/Behavioral: The patient is not nervous/anxious.        ECOG Performance Status:   ECOG SCORE 0       Objective:      Vitals:   Vitals:    10/14/19 0810   BP: 111/72   Pulse: 65   Resp: 18   Temp: 97.5 °F (36.4 °C)   TempSrc: Oral   SpO2: 99%   Weight: 69.8 kg (153 lb 14.1 oz)     BMI: Body mass index is 26.83 kg/m².    Physical Exam   Constitutional: She is oriented to person, place, and time. She appears well-developed and well-nourished.   HENT:   Head: Normocephalic and atraumatic.   Eyes: Pupils are equal, round, and reactive to light. EOM are normal.   Neck: Normal range of motion. Neck supple.   Cardiovascular: Normal rate and regular rhythm.   Pulmonary/Chest: Effort normal and breath sounds normal.   Abdominal: Soft. Bowel sounds are normal.   Musculoskeletal: Normal range of motion. She exhibits no edema.   Neurological:  She is alert and oriented to person, place, and time.   Skin: Skin is warm and dry.   Psychiatric: She has a normal mood and affect. Her behavior is normal. Judgment and thought content normal.   Nursing note and vitals reviewed.    Laboratory Data:  Labs have been reviewed.    Lab Results   Component Value Date    WBC 9.20 10/09/2019    HGB 10.2 (L) 10/09/2019    HCT 34.3 (L) 10/09/2019    MCV 89 10/09/2019     10/09/2019         Imaging:    Assessment:       1. Iron deficiency anemia due to chronic blood loss    2. Advance care planning           Plan:     1. Iron deficiency anemia due to chronic blood loss  - I have reviewed her labs.  - on 10/9/19, she had a normocytic anemia with decreased ferritin/saturated iron/iron and increased total iron binding capacity. These findings are consistent with severe iron deficiency.  - she started taking ferrous sulfate 325 mg PO BID. She does not note and gastrointestinal side effects from the medication.  - treatment options include: a 5-6-week trial of ferrous sulfate, with parenteral iron if her iron studies have not improved; or proceeding with parenteral iron now.  - after discussion, she would like to try oral ferrous sulfate first. I will schedule repeat iron studies in mid-November 2019. If her iron studies have not improved, I will recommend parenteral iron at that time. She is agreeable with this plan.  - regarding etiology of her iron deficiency, she does not endorse menorrhagia. She has a history of hemorrhoids. I offered referral to gastroenterology, but she declined at this time.  - I will call her with the results of tests in November 2019.    2. Advance Care Planning     Power of   I initiated the process of advance care planning today and explained the importance of this process to the patient.  I introduced the concept of advance directives to the patient, as well. Then the patient received detailed information about the importance of  designating a Health Care Power of  (HCPOA). She was also instructed to communicate with this person about their wishes for future healthcare, should she become sick and lose decision-making capacity. The patient has not previously appointed a HCPOA. After our discussion, the patient has decided to complete a HCPOA and has appointed her niece Symone Delgadillo ([phone]). I spent a total time of 16 minutes discussing this issue with the patient.       - I will schedule repeat iron studies in mid-November 2019. If her iron studies have not improved, I will recommend parenteral iron at that time.     Shad Meyer M.D.  Hematology/Oncology  [facility] - [address], [address]  [place] [address]  Phone: [phone]  Fax: [phone]

## 2019-11-14 ENCOUNTER — LAB VISIT (OUTPATIENT)
Dept: LAB | Facility: HOSPITAL | Age: 49
End: 2019-11-14
Attending: INTERNAL MEDICINE
Payer: COMMERCIAL

## 2019-11-14 DIAGNOSIS — D50.0 IRON DEFICIENCY ANEMIA DUE TO CHRONIC BLOOD LOSS: ICD-10-CM

## 2019-11-14 LAB
BASOPHILS # BLD AUTO: 0.02 K/UL (ref 0–0.2)
BASOPHILS NFR BLD: 0.3 % (ref 0–1.9)
DIFFERENTIAL METHOD: ABNORMAL
EOSINOPHIL # BLD AUTO: 0.1 K/UL (ref 0–0.5)
EOSINOPHIL NFR BLD: 1.2 % (ref 0–8)
ERYTHROCYTE [DISTWIDTH] IN BLOOD BY AUTOMATED COUNT: 17.9 % (ref 11.5–14.5)
FERRITIN SERPL-MCNC: 55 NG/ML (ref 20–300)
HCT VFR BLD AUTO: 37.3 % (ref 37–48.5)
HGB BLD-MCNC: 11.7 G/DL (ref 12–16)
IRON SERPL-MCNC: 106 UG/DL (ref 30–160)
LYMPHOCYTES # BLD AUTO: 2.4 K/UL (ref 1–4.8)
LYMPHOCYTES NFR BLD: 35.4 % (ref 18–48)
MCH RBC QN AUTO: 27.8 PG (ref 27–31)
MCHC RBC AUTO-ENTMCNC: 31.4 G/DL (ref 32–36)
MCV RBC AUTO: 89 FL (ref 82–98)
MONOCYTES # BLD AUTO: 0.6 K/UL (ref 0.3–1)
MONOCYTES NFR BLD: 8.9 % (ref 4–15)
NEUTROPHILS # BLD AUTO: 3.7 K/UL (ref 1.8–7.7)
NEUTROPHILS NFR BLD: 54.2 % (ref 38–73)
PLATELET # BLD AUTO: 281 K/UL (ref 150–350)
PMV BLD AUTO: 10.2 FL (ref 9.2–12.9)
RBC # BLD AUTO: 4.21 M/UL (ref 4–5.4)
SATURATED IRON: 27 % (ref 20–50)
TOTAL IRON BINDING CAPACITY: 397 UG/DL (ref 250–450)
TRANSFERRIN SERPL-MCNC: 268 MG/DL (ref 200–375)
WBC # BLD AUTO: 6.84 K/UL (ref 3.9–12.7)

## 2019-11-14 PROCEDURE — 36415 COLL VENOUS BLD VENIPUNCTURE: CPT

## 2019-11-14 PROCEDURE — 85025 COMPLETE CBC W/AUTO DIFF WBC: CPT

## 2019-11-14 PROCEDURE — 82728 ASSAY OF FERRITIN: CPT

## 2019-11-14 PROCEDURE — 83540 ASSAY OF IRON: CPT

## 2019-11-15 ENCOUNTER — TELEPHONE (OUTPATIENT)
Dept: HEMATOLOGY/ONCOLOGY | Facility: CLINIC | Age: 49
End: 2019-11-15

## 2019-11-15 DIAGNOSIS — D50.8 OTHER IRON DEFICIENCY ANEMIA: ICD-10-CM

## 2019-11-15 RX ORDER — FERROUS SULFATE 325(65) MG
325 TABLET ORAL DAILY
Qty: 90 TABLET | Refills: 3
Start: 2019-11-15 | End: 2020-08-03

## 2019-11-15 NOTE — TELEPHONE ENCOUNTER
I called and informed her that her iron studies have improved. I decreased her ferrous sulfate from BID to once daily.    Return to clinic as needed.    Shad Meyer M.D.  Hematology/Oncology  [facility] - [address], [address]  [place] [address]  Phone: [phone]  Fax: [phone]

## 2020-07-31 NOTE — PROGRESS NOTES
Subjective:       Patient ID: Madelin Hidalgo is a 49 y.o. female.    Chief Complaint: Annual Exam      Madelin Hidalgo is a 49 y.o. female who presents today for a physical  She goes by Christi.      Diet: She mostly eats chinese food. 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 This is not fried or oily usually. No soda. She drinks juice and sweet tea occasionally.   Exercise: she doesn't exercise.      She has gained 5 pounds. She didn't work for some time due to covid.     She has a history of a possible thyroid procedure, but she's not sure what exactly was done. TSH has been normal.     She has a history of hemorrhoids. Declines exam today and the last visit.     Not taking iron, currently. Went from 2 pills to one pill and then stopped.      Tdap: UTD  Labs: ordered  Mammogram: ordered     Pap: sees Gyn     PMHx: reviewed in EMR and updated  Meds: reviewed in EMR and updated  Shx: reviewed in EMR and updated  FMHx: no family history of colon cancer, breast cancer, ovarian cancer that she is aware of.   Social: lives with her  and two children. Her  is also a patient of mine. There are no pets at home. 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      [employer] used for [employer] services.     Review of Systems   Constitutional: Negative for chills and fever.   Respiratory: Negative for cough and shortness of breath.    Cardiovascular: Negative for chest pain.   Gastrointestinal: Negative for blood in stool, diarrhea, nausea and vomiting.   Skin: Negative for rash.   Neurological: Negative for dizziness and light-headedness.         There are no preventive care reminders to display for this patient.  Immunization History   Administered Date(s) Administered    Tdap 06/25/2018       Objective:     Vitals:    08/03/20 0814   BP: 112/81   Pulse: 66        Physical Exam  Vitals signs and nursing note reviewed.   Constitutional:        Appearance: She is well-developed.   HENT:      Head: Normocephalic and atraumatic.      Right Ear: Tympanic membrane, ear canal and external ear normal.      Left Ear: Tympanic membrane, ear canal and external ear normal.      Nose: Nose normal.   Eyes:      Conjunctiva/sclera: Conjunctivae normal.      Pupils: Pupils are equal, round, and reactive to light.   Neck:      Musculoskeletal: Normal range of motion.      Thyroid: No thyromegaly.      Comments: Small non midline surgical scar noted on throat, right side  Cardiovascular:      Rate and Rhythm: Normal rate and regular rhythm.   Pulmonary:      Effort: Pulmonary effort is normal. No respiratory distress.      Breath sounds: Normal breath sounds.   Abdominal:      General: There is no distension.      Palpations: Abdomen is soft.      Tenderness: There is no abdominal tenderness.   Genitourinary:     Comments: deferred   Skin:     General: Skin is warm and dry.   Neurological:      Mental Status: She is alert and oriented to person, place, and time.      Cranial Nerves: No cranial nerve deficit.      Sensory: No sensory deficit.   Psychiatric:         Speech: Speech normal.         Behavior: Behavior normal.         Thought Content: Thought content normal.         Judgment: Judgment normal.         Assessment:       1. Well woman exam    2. Iron deficiency anemia due to chronic blood loss    3. History of thyroid surgery    4. Encounter for screening mammogram for malignant neoplasm of breast    5. BMI 27.0-27.9,adult    6. Other hemorrhoids    7. Colon cancer screening        Plan:       ?Avoid tobacco  ?Be physically active  ?Maintain a healthy weight  ?Eat a diet rich in fruits, vegetables, and whole grains, and low in saturated/trans fat  ?Limit alcohol consumption  ?Protect against sexually transmitted infections  ?Avoid excess sun  RTC as directed during the visit, as needed or in 1 year for a physical with labs prior. Call one month prior to the  physical to schedule apt and labs.     At 50 will need c-scope, declined referral now  Will recheck iron today    Advised f/u with gyn annually, patient would like to defer for now due to covid      Well woman exam  -     CBC auto differential; Future; Expected date: 08/03/2020  -     Comprehensive metabolic panel; Future; Expected date: 08/03/2020  -     Hemoglobin A1C; Future; Expected date: 08/03/2020  -     Lipid Panel; Future; Expected date: 08/03/2020  -     TSH; Future; Expected date: 08/03/2020  -     Vitamin D; Future; Expected date: 08/03/2020  -     Iron and TIBC; Future; Expected date: 08/03/2020  -     Ferritin; Future; Expected date: 08/03/2020    Iron deficiency anemia due to chronic blood loss  -     Iron and TIBC; Future; Expected date: 08/03/2020  -     Ferritin; Future; Expected date: 08/03/2020    History of thyroid surgery    Encounter for screening mammogram for malignant neoplasm of breast  -     Mammo Digital Screening Bilat; Future; Expected date: 08/03/2020    BMI 27.0-27.9,adult    Other hemorrhoids  -     Cancel: Ambulatory referral/consult to Colorectal Surgery; Future; Expected date: 02/03/2021    Colon cancer screening  -     Cancel: Ambulatory referral/consult to Colorectal Surgery; Future; Expected date: 02/03/2021

## 2020-08-03 ENCOUNTER — LAB VISIT (OUTPATIENT)
Dept: LAB | Facility: HOSPITAL | Age: 50
End: 2020-08-03
Attending: FAMILY MEDICINE
Payer: COMMERCIAL

## 2020-08-03 ENCOUNTER — OFFICE VISIT (OUTPATIENT)
Dept: FAMILY MEDICINE | Facility: CLINIC | Age: 50
End: 2020-08-03
Payer: COMMERCIAL

## 2020-08-03 VITALS
HEIGHT: 64 IN | DIASTOLIC BLOOD PRESSURE: 81 MMHG | OXYGEN SATURATION: 99 % | BODY MASS INDEX: 26.98 KG/M2 | WEIGHT: 158.06 LBS | HEART RATE: 66 BPM | SYSTOLIC BLOOD PRESSURE: 112 MMHG

## 2020-08-03 DIAGNOSIS — Z12.11 COLON CANCER SCREENING: ICD-10-CM

## 2020-08-03 DIAGNOSIS — Z98.890 HISTORY OF THYROID SURGERY: ICD-10-CM

## 2020-08-03 DIAGNOSIS — Z12.31 ENCOUNTER FOR SCREENING MAMMOGRAM FOR MALIGNANT NEOPLASM OF BREAST: ICD-10-CM

## 2020-08-03 DIAGNOSIS — Z01.419 WELL WOMAN EXAM: Primary | ICD-10-CM

## 2020-08-03 DIAGNOSIS — D50.0 IRON DEFICIENCY ANEMIA DUE TO CHRONIC BLOOD LOSS: ICD-10-CM

## 2020-08-03 DIAGNOSIS — Z01.419 WELL WOMAN EXAM: ICD-10-CM

## 2020-08-03 DIAGNOSIS — K64.8 OTHER HEMORRHOIDS: ICD-10-CM

## 2020-08-03 LAB
ALBUMIN SERPL BCP-MCNC: 3.9 G/DL (ref 3.5–5.2)
ALP SERPL-CCNC: 78 U/L (ref 55–135)
ALT SERPL W/O P-5'-P-CCNC: 28 U/L (ref 10–44)
ANION GAP SERPL CALC-SCNC: 12 MMOL/L (ref 8–16)
AST SERPL-CCNC: 26 U/L (ref 10–40)
BASOPHILS # BLD AUTO: 0.04 K/UL (ref 0–0.2)
BASOPHILS NFR BLD: 0.5 % (ref 0–1.9)
BILIRUB SERPL-MCNC: 0.4 MG/DL (ref 0.1–1)
BUN SERPL-MCNC: 12 MG/DL (ref 6–20)
CALCIUM SERPL-MCNC: 9.4 MG/DL (ref 8.7–10.5)
CHLORIDE SERPL-SCNC: 107 MMOL/L (ref 95–110)
CHOLEST SERPL-MCNC: 245 MG/DL (ref 120–199)
CHOLEST/HDLC SERPL: 4.2 {RATIO} (ref 2–5)
CO2 SERPL-SCNC: 22 MMOL/L (ref 23–29)
CREAT SERPL-MCNC: 0.7 MG/DL (ref 0.5–1.4)
DIFFERENTIAL METHOD: ABNORMAL
EOSINOPHIL # BLD AUTO: 0.1 K/UL (ref 0–0.5)
EOSINOPHIL NFR BLD: 1 % (ref 0–8)
ERYTHROCYTE [DISTWIDTH] IN BLOOD BY AUTOMATED COUNT: 12.5 % (ref 11.5–14.5)
EST. GFR  (AFRICAN AMERICAN): >60 ML/MIN/1.73 M^2
EST. GFR  (NON AFRICAN AMERICAN): >60 ML/MIN/1.73 M^2
FERRITIN SERPL-MCNC: 34 NG/ML (ref 20–300)
GLUCOSE SERPL-MCNC: 77 MG/DL (ref 70–110)
HCT VFR BLD AUTO: 41 % (ref 37–48.5)
HDLC SERPL-MCNC: 59 MG/DL (ref 40–75)
HDLC SERPL: 24.1 % (ref 20–50)
HGB BLD-MCNC: 12.5 G/DL (ref 12–16)
IMM GRANULOCYTES # BLD AUTO: 0.03 K/UL (ref 0–0.04)
IMM GRANULOCYTES NFR BLD AUTO: 0.3 % (ref 0–0.5)
IRON SERPL-MCNC: 79 UG/DL (ref 30–160)
LDLC SERPL CALC-MCNC: 136.2 MG/DL (ref 63–159)
LYMPHOCYTES # BLD AUTO: 2.5 K/UL (ref 1–4.8)
LYMPHOCYTES NFR BLD: 28.1 % (ref 18–48)
MCH RBC QN AUTO: 29.8 PG (ref 27–31)
MCHC RBC AUTO-ENTMCNC: 30.5 G/DL (ref 32–36)
MCV RBC AUTO: 98 FL (ref 82–98)
MONOCYTES # BLD AUTO: 0.6 K/UL (ref 0.3–1)
MONOCYTES NFR BLD: 6.3 % (ref 4–15)
NEUTROPHILS # BLD AUTO: 5.6 K/UL (ref 1.8–7.7)
NEUTROPHILS NFR BLD: 63.8 % (ref 38–73)
NONHDLC SERPL-MCNC: 186 MG/DL
NRBC BLD-RTO: 0 /100 WBC
PLATELET # BLD AUTO: 331 K/UL (ref 150–350)
PMV BLD AUTO: 10.5 FL (ref 9.2–12.9)
POTASSIUM SERPL-SCNC: 4 MMOL/L (ref 3.5–5.1)
PROT SERPL-MCNC: 7.9 G/DL (ref 6–8.4)
RBC # BLD AUTO: 4.2 M/UL (ref 4–5.4)
SATURATED IRON: 17 % (ref 20–50)
SODIUM SERPL-SCNC: 141 MMOL/L (ref 136–145)
TOTAL IRON BINDING CAPACITY: 453 UG/DL (ref 250–450)
TRANSFERRIN SERPL-MCNC: 306 MG/DL (ref 200–375)
TRIGL SERPL-MCNC: 249 MG/DL (ref 30–150)
TSH SERPL DL<=0.005 MIU/L-ACNC: 3.52 UIU/ML (ref 0.4–4)
WBC # BLD AUTO: 8.73 K/UL (ref 3.9–12.7)

## 2020-08-03 PROCEDURE — 80053 COMPREHEN METABOLIC PANEL: CPT

## 2020-08-03 PROCEDURE — 85025 COMPLETE CBC W/AUTO DIFF WBC: CPT

## 2020-08-03 PROCEDURE — 84443 ASSAY THYROID STIM HORMONE: CPT

## 2020-08-03 PROCEDURE — 99396 PR PREVENTIVE VISIT,EST,40-64: ICD-10-PCS | Mod: S$GLB,,, | Performed by: FAMILY MEDICINE

## 2020-08-03 PROCEDURE — 99396 PREV VISIT EST AGE 40-64: CPT | Mod: S$GLB,,, | Performed by: FAMILY MEDICINE

## 2020-08-03 PROCEDURE — 99999 PR PBB SHADOW E&M-EST. PATIENT-LVL IV: CPT | Mod: PBBFAC,,, | Performed by: FAMILY MEDICINE

## 2020-08-03 PROCEDURE — 82728 ASSAY OF FERRITIN: CPT

## 2020-08-03 PROCEDURE — 80061 LIPID PANEL: CPT

## 2020-08-03 PROCEDURE — 82306 VITAMIN D 25 HYDROXY: CPT

## 2020-08-03 PROCEDURE — 3008F PR BODY MASS INDEX (BMI) DOCUMENTED: ICD-10-PCS | Mod: CPTII,S$GLB,, | Performed by: FAMILY MEDICINE

## 2020-08-03 PROCEDURE — 36415 COLL VENOUS BLD VENIPUNCTURE: CPT | Mod: PO

## 2020-08-03 PROCEDURE — 99999 PR PBB SHADOW E&M-EST. PATIENT-LVL IV: ICD-10-PCS | Mod: PBBFAC,,, | Performed by: FAMILY MEDICINE

## 2020-08-03 PROCEDURE — 83540 ASSAY OF IRON: CPT

## 2020-08-03 PROCEDURE — 83036 HEMOGLOBIN GLYCOSYLATED A1C: CPT

## 2020-08-03 PROCEDURE — 3008F BODY MASS INDEX DOCD: CPT | Mod: CPTII,S$GLB,, | Performed by: FAMILY MEDICINE

## 2020-08-03 NOTE — PATIENT INSTRUCTIONS
?Avoid tobacco  ?Be physically active  ?Maintain a healthy weight  ?Eat a diet rich in fruits, vegetables, and whole grains, and low in saturated/trans fat  ?Limit alcohol consumption  ?Protect against sexually transmitted infections  ?Avoid excess sun  RTC as directed during the visit, as needed or in 1 year for a physical with labs prior. Call one month prior to the physical to schedule apt and labs.     At 50 will need c-scope, declined referral now  Will recheck iron today    Advised f/u with gyn annually, patient would like to defer for now due to covid

## 2020-08-04 LAB
25(OH)D3+25(OH)D2 SERPL-MCNC: 17 NG/ML (ref 30–96)
ESTIMATED AVG GLUCOSE: 108 MG/DL (ref 68–131)
HBA1C MFR BLD HPLC: 5.4 % (ref 4–5.6)

## 2020-08-24 ENCOUNTER — HOSPITAL ENCOUNTER (OUTPATIENT)
Dept: RADIOLOGY | Facility: HOSPITAL | Age: 50
Discharge: HOME | End: 2020-08-24
Attending: FAMILY MEDICINE
Payer: COMMERCIAL

## 2020-08-24 DIAGNOSIS — Z12.31 ENCOUNTER FOR SCREENING MAMMOGRAM FOR MALIGNANT NEOPLASM OF BREAST: ICD-10-CM

## 2020-08-24 PROCEDURE — 77067 SCR MAMMO BI INCL CAD: CPT | Mod: 26,,, | Performed by: RADIOLOGY

## 2020-08-24 PROCEDURE — 77063 MAMMO DIGITAL SCREENING BILAT WITH TOMOSYNTHESIS_CAD: ICD-10-PCS | Mod: 26,,, | Performed by: RADIOLOGY

## 2020-08-24 PROCEDURE — 77063 BREAST TOMOSYNTHESIS BI: CPT | Mod: 26,,, | Performed by: RADIOLOGY

## 2020-08-24 PROCEDURE — 77067 MAMMO DIGITAL SCREENING BILAT WITH TOMOSYNTHESIS_CAD: ICD-10-PCS | Mod: 26,,, | Performed by: RADIOLOGY

## 2020-08-24 PROCEDURE — 77067 SCR MAMMO BI INCL CAD: CPT | Mod: TC,PO

## 2021-05-10 ENCOUNTER — PATIENT MESSAGE (OUTPATIENT)
Dept: RESEARCH | Facility: HOSPITAL | Age: 51
End: 2021-05-10

## 2021-07-01 ENCOUNTER — PATIENT MESSAGE (OUTPATIENT)
Dept: ADMINISTRATIVE | Facility: OTHER | Age: 51
End: 2021-07-01

## 2021-07-10 ENCOUNTER — LAB VISIT (OUTPATIENT)
Dept: LAB | Facility: HOSPITAL | Age: 51
End: 2021-07-10
Attending: INTERNAL MEDICINE
Payer: COMMERCIAL

## 2021-07-10 ENCOUNTER — OFFICE VISIT (OUTPATIENT)
Dept: INTERNAL MEDICINE | Facility: CLINIC | Age: 51
End: 2021-07-10
Payer: COMMERCIAL

## 2021-07-10 VITALS
TEMPERATURE: 98 F | WEIGHT: 167.75 LBS | BODY MASS INDEX: 28.64 KG/M2 | OXYGEN SATURATION: 99 % | HEIGHT: 64 IN | DIASTOLIC BLOOD PRESSURE: 72 MMHG | SYSTOLIC BLOOD PRESSURE: 116 MMHG | HEART RATE: 73 BPM

## 2021-07-10 DIAGNOSIS — A08.4 VIRAL GASTROENTERITIS: ICD-10-CM

## 2021-07-10 DIAGNOSIS — A08.4 VIRAL GASTROENTERITIS: Primary | ICD-10-CM

## 2021-07-10 LAB
ALBUMIN SERPL BCP-MCNC: 3.7 G/DL (ref 3.5–5.2)
ALP SERPL-CCNC: 81 U/L (ref 55–135)
ALT SERPL W/O P-5'-P-CCNC: 42 U/L (ref 10–44)
ANION GAP SERPL CALC-SCNC: 11 MMOL/L (ref 8–16)
AST SERPL-CCNC: 25 U/L (ref 10–40)
BASOPHILS # BLD AUTO: 0.04 K/UL (ref 0–0.2)
BASOPHILS NFR BLD: 0.4 % (ref 0–1.9)
BILIRUB SERPL-MCNC: 0.3 MG/DL (ref 0.1–1)
BUN SERPL-MCNC: 11 MG/DL (ref 6–20)
CALCIUM SERPL-MCNC: 8.5 MG/DL (ref 8.7–10.5)
CHLORIDE SERPL-SCNC: 107 MMOL/L (ref 95–110)
CO2 SERPL-SCNC: 22 MMOL/L (ref 23–29)
CREAT SERPL-MCNC: 0.7 MG/DL (ref 0.5–1.4)
DIFFERENTIAL METHOD: ABNORMAL
EOSINOPHIL # BLD AUTO: 0.1 K/UL (ref 0–0.5)
EOSINOPHIL NFR BLD: 1.1 % (ref 0–8)
ERYTHROCYTE [DISTWIDTH] IN BLOOD BY AUTOMATED COUNT: 13.8 % (ref 11.5–14.5)
EST. GFR  (AFRICAN AMERICAN): >60 ML/MIN/1.73 M^2
EST. GFR  (NON AFRICAN AMERICAN): >60 ML/MIN/1.73 M^2
GLUCOSE SERPL-MCNC: 104 MG/DL (ref 70–110)
HCT VFR BLD AUTO: 36.6 % (ref 37–48.5)
HGB BLD-MCNC: 11.5 G/DL (ref 12–16)
IMM GRANULOCYTES # BLD AUTO: 0.02 K/UL (ref 0–0.04)
IMM GRANULOCYTES NFR BLD AUTO: 0.2 % (ref 0–0.5)
LYMPHOCYTES # BLD AUTO: 2.5 K/UL (ref 1–4.8)
LYMPHOCYTES NFR BLD: 25 % (ref 18–48)
MCH RBC QN AUTO: 26.6 PG (ref 27–31)
MCHC RBC AUTO-ENTMCNC: 31.4 G/DL (ref 32–36)
MCV RBC AUTO: 85 FL (ref 82–98)
MONOCYTES # BLD AUTO: 0.7 K/UL (ref 0.3–1)
MONOCYTES NFR BLD: 7 % (ref 4–15)
NEUTROPHILS # BLD AUTO: 6.7 K/UL (ref 1.8–7.7)
NEUTROPHILS NFR BLD: 66.3 % (ref 38–73)
NRBC BLD-RTO: 0 /100 WBC
PLATELET # BLD AUTO: 324 K/UL (ref 150–450)
PMV BLD AUTO: 9.9 FL (ref 9.2–12.9)
POTASSIUM SERPL-SCNC: 3.8 MMOL/L (ref 3.5–5.1)
PROT SERPL-MCNC: 7.3 G/DL (ref 6–8.4)
RBC # BLD AUTO: 4.32 M/UL (ref 4–5.4)
SODIUM SERPL-SCNC: 140 MMOL/L (ref 136–145)
WBC # BLD AUTO: 10.14 K/UL (ref 3.9–12.7)

## 2021-07-10 PROCEDURE — 36415 COLL VENOUS BLD VENIPUNCTURE: CPT | Performed by: INTERNAL MEDICINE

## 2021-07-10 PROCEDURE — 3008F BODY MASS INDEX DOCD: CPT | Mod: CPTII,S$GLB,, | Performed by: INTERNAL MEDICINE

## 2021-07-10 PROCEDURE — 99999 PR PBB SHADOW E&M-EST. PATIENT-LVL III: ICD-10-PCS | Mod: PBBFAC,,, | Performed by: INTERNAL MEDICINE

## 2021-07-10 PROCEDURE — 99214 OFFICE O/P EST MOD 30 MIN: CPT | Mod: S$GLB,,, | Performed by: INTERNAL MEDICINE

## 2021-07-10 PROCEDURE — 99999 PR PBB SHADOW E&M-EST. PATIENT-LVL III: CPT | Mod: PBBFAC,,, | Performed by: INTERNAL MEDICINE

## 2021-07-10 PROCEDURE — 1126F AMNT PAIN NOTED NONE PRSNT: CPT | Mod: S$GLB,,, | Performed by: INTERNAL MEDICINE

## 2021-07-10 PROCEDURE — 99214 PR OFFICE/OUTPT VISIT, EST, LEVL IV, 30-39 MIN: ICD-10-PCS | Mod: S$GLB,,, | Performed by: INTERNAL MEDICINE

## 2021-07-10 PROCEDURE — 3008F PR BODY MASS INDEX (BMI) DOCUMENTED: ICD-10-PCS | Mod: CPTII,S$GLB,, | Performed by: INTERNAL MEDICINE

## 2021-07-10 PROCEDURE — 80053 COMPREHEN METABOLIC PANEL: CPT | Performed by: INTERNAL MEDICINE

## 2021-07-10 PROCEDURE — 1126F PR PAIN SEVERITY QUANTIFIED, NO PAIN PRESENT: ICD-10-PCS | Mod: S$GLB,,, | Performed by: INTERNAL MEDICINE

## 2021-07-10 PROCEDURE — 85025 COMPLETE CBC W/AUTO DIFF WBC: CPT | Performed by: INTERNAL MEDICINE

## 2021-07-10 RX ORDER — ONDANSETRON 4 MG/1
4 TABLET, FILM COATED ORAL EVERY 8 HOURS PRN
Qty: 30 TABLET | Refills: 0 | Status: SHIPPED | OUTPATIENT
Start: 2021-07-10 | End: 2021-11-03

## 2021-07-10 RX ORDER — PANTOPRAZOLE SODIUM 40 MG/1
40 TABLET, DELAYED RELEASE ORAL DAILY
Qty: 30 TABLET | Refills: 0 | Status: SHIPPED | OUTPATIENT
Start: 2021-07-10 | End: 2021-11-03

## 2021-11-02 PROBLEM — E55.9 VITAMIN D DEFICIENCY: Status: ACTIVE | Noted: 2021-11-02

## 2021-11-03 ENCOUNTER — LAB VISIT (OUTPATIENT)
Dept: LAB | Facility: HOSPITAL | Age: 51
End: 2021-11-03
Attending: FAMILY MEDICINE
Payer: COMMERCIAL

## 2021-11-03 ENCOUNTER — OFFICE VISIT (OUTPATIENT)
Dept: FAMILY MEDICINE | Facility: CLINIC | Age: 51
End: 2021-11-03
Payer: COMMERCIAL

## 2021-11-03 VITALS
HEIGHT: 64 IN | DIASTOLIC BLOOD PRESSURE: 86 MMHG | SYSTOLIC BLOOD PRESSURE: 128 MMHG | HEART RATE: 67 BPM | WEIGHT: 163.81 LBS | BODY MASS INDEX: 27.96 KG/M2 | OXYGEN SATURATION: 98 %

## 2021-11-03 DIAGNOSIS — E55.9 VITAMIN D DEFICIENCY: ICD-10-CM

## 2021-11-03 DIAGNOSIS — Z12.11 COLON CANCER SCREENING: ICD-10-CM

## 2021-11-03 DIAGNOSIS — D50.0 IRON DEFICIENCY ANEMIA DUE TO CHRONIC BLOOD LOSS: ICD-10-CM

## 2021-11-03 DIAGNOSIS — Z12.4 CERVICAL CANCER SCREENING: ICD-10-CM

## 2021-11-03 DIAGNOSIS — Z01.419 WELL WOMAN EXAM: ICD-10-CM

## 2021-11-03 DIAGNOSIS — Z12.31 ENCOUNTER FOR SCREENING MAMMOGRAM FOR MALIGNANT NEOPLASM OF BREAST: ICD-10-CM

## 2021-11-03 DIAGNOSIS — Z01.419 WELL WOMAN EXAM: Primary | ICD-10-CM

## 2021-11-03 PROBLEM — R79.89 LOW VITAMIN D LEVEL: Status: RESOLVED | Noted: 2018-06-26 | Resolved: 2021-11-03

## 2021-11-03 LAB
25(OH)D3+25(OH)D2 SERPL-MCNC: 20 NG/ML (ref 30–96)
ALBUMIN SERPL BCP-MCNC: 4 G/DL (ref 3.5–5.2)
ALP SERPL-CCNC: 95 U/L (ref 55–135)
ALT SERPL W/O P-5'-P-CCNC: 29 U/L (ref 10–44)
ANION GAP SERPL CALC-SCNC: 12 MMOL/L (ref 8–16)
AST SERPL-CCNC: 24 U/L (ref 10–40)
BASOPHILS # BLD AUTO: 0.04 K/UL (ref 0–0.2)
BASOPHILS NFR BLD: 0.5 % (ref 0–1.9)
BILIRUB SERPL-MCNC: 0.5 MG/DL (ref 0.1–1)
BUN SERPL-MCNC: 13 MG/DL (ref 6–20)
CALCIUM SERPL-MCNC: 9.2 MG/DL (ref 8.7–10.5)
CHLORIDE SERPL-SCNC: 105 MMOL/L (ref 95–110)
CHOLEST SERPL-MCNC: 203 MG/DL (ref 120–199)
CHOLEST/HDLC SERPL: 3.8 {RATIO} (ref 2–5)
CO2 SERPL-SCNC: 24 MMOL/L (ref 23–29)
CREAT SERPL-MCNC: 0.6 MG/DL (ref 0.5–1.4)
DIFFERENTIAL METHOD: ABNORMAL
EOSINOPHIL # BLD AUTO: 0.1 K/UL (ref 0–0.5)
EOSINOPHIL NFR BLD: 1.3 % (ref 0–8)
ERYTHROCYTE [DISTWIDTH] IN BLOOD BY AUTOMATED COUNT: 15.2 % (ref 11.5–14.5)
EST. GFR  (AFRICAN AMERICAN): >60 ML/MIN/1.73 M^2
EST. GFR  (NON AFRICAN AMERICAN): >60 ML/MIN/1.73 M^2
ESTIMATED AVG GLUCOSE: 123 MG/DL (ref 68–131)
FERRITIN SERPL-MCNC: 10 NG/ML (ref 20–300)
GLUCOSE SERPL-MCNC: 96 MG/DL (ref 70–110)
HBA1C MFR BLD: 5.9 % (ref 4–5.6)
HCT VFR BLD AUTO: 37.5 % (ref 37–48.5)
HDLC SERPL-MCNC: 53 MG/DL (ref 40–75)
HDLC SERPL: 26.1 % (ref 20–50)
HGB BLD-MCNC: 11.2 G/DL (ref 12–16)
IMM GRANULOCYTES # BLD AUTO: 0.01 K/UL (ref 0–0.04)
IMM GRANULOCYTES NFR BLD AUTO: 0.1 % (ref 0–0.5)
IRON SERPL-MCNC: 30 UG/DL (ref 30–160)
LDLC SERPL CALC-MCNC: 114.4 MG/DL (ref 63–159)
LYMPHOCYTES # BLD AUTO: 2.2 K/UL (ref 1–4.8)
LYMPHOCYTES NFR BLD: 29.4 % (ref 18–48)
MCH RBC QN AUTO: 25.7 PG (ref 27–31)
MCHC RBC AUTO-ENTMCNC: 29.9 G/DL (ref 32–36)
MCV RBC AUTO: 86 FL (ref 82–98)
MONOCYTES # BLD AUTO: 0.5 K/UL (ref 0.3–1)
MONOCYTES NFR BLD: 6.7 % (ref 4–15)
NEUTROPHILS # BLD AUTO: 4.6 K/UL (ref 1.8–7.7)
NEUTROPHILS NFR BLD: 62 % (ref 38–73)
NONHDLC SERPL-MCNC: 150 MG/DL
NRBC BLD-RTO: 0 /100 WBC
PLATELET # BLD AUTO: 398 K/UL (ref 150–450)
PMV BLD AUTO: 10.2 FL (ref 9.2–12.9)
POTASSIUM SERPL-SCNC: 3.7 MMOL/L (ref 3.5–5.1)
PROT SERPL-MCNC: 8 G/DL (ref 6–8.4)
RBC # BLD AUTO: 4.35 M/UL (ref 4–5.4)
SATURATED IRON: 6 % (ref 20–50)
SODIUM SERPL-SCNC: 141 MMOL/L (ref 136–145)
TOTAL IRON BINDING CAPACITY: 514 UG/DL (ref 250–450)
TRANSFERRIN SERPL-MCNC: 347 MG/DL (ref 200–375)
TRIGL SERPL-MCNC: 178 MG/DL (ref 30–150)
TSH SERPL DL<=0.005 MIU/L-ACNC: 3.45 UIU/ML (ref 0.4–4)
WBC # BLD AUTO: 7.41 K/UL (ref 3.9–12.7)

## 2021-11-03 PROCEDURE — 3079F DIAST BP 80-89 MM HG: CPT | Mod: CPTII,S$GLB,, | Performed by: FAMILY MEDICINE

## 2021-11-03 PROCEDURE — 3074F PR MOST RECENT SYSTOLIC BLOOD PRESSURE < 130 MM HG: ICD-10-PCS | Mod: CPTII,S$GLB,, | Performed by: FAMILY MEDICINE

## 2021-11-03 PROCEDURE — 82728 ASSAY OF FERRITIN: CPT | Performed by: FAMILY MEDICINE

## 2021-11-03 PROCEDURE — 85025 COMPLETE CBC W/AUTO DIFF WBC: CPT | Performed by: FAMILY MEDICINE

## 2021-11-03 PROCEDURE — 99999 PR PBB SHADOW E&M-EST. PATIENT-LVL IV: ICD-10-PCS | Mod: PBBFAC,,, | Performed by: FAMILY MEDICINE

## 2021-11-03 PROCEDURE — 99999 PR PBB SHADOW E&M-EST. PATIENT-LVL IV: CPT | Mod: PBBFAC,,, | Performed by: FAMILY MEDICINE

## 2021-11-03 PROCEDURE — 1160F RVW MEDS BY RX/DR IN RCRD: CPT | Mod: CPTII,S$GLB,, | Performed by: FAMILY MEDICINE

## 2021-11-03 PROCEDURE — 84466 ASSAY OF TRANSFERRIN: CPT | Performed by: FAMILY MEDICINE

## 2021-11-03 PROCEDURE — 83036 HEMOGLOBIN GLYCOSYLATED A1C: CPT | Performed by: FAMILY MEDICINE

## 2021-11-03 PROCEDURE — 3074F SYST BP LT 130 MM HG: CPT | Mod: CPTII,S$GLB,, | Performed by: FAMILY MEDICINE

## 2021-11-03 PROCEDURE — 1160F PR REVIEW ALL MEDS BY PRESCRIBER/CLIN PHARMACIST DOCUMENTED: ICD-10-PCS | Mod: CPTII,S$GLB,, | Performed by: FAMILY MEDICINE

## 2021-11-03 PROCEDURE — 3079F PR MOST RECENT DIASTOLIC BLOOD PRESSURE 80-89 MM HG: ICD-10-PCS | Mod: CPTII,S$GLB,, | Performed by: FAMILY MEDICINE

## 2021-11-03 PROCEDURE — 3008F BODY MASS INDEX DOCD: CPT | Mod: CPTII,S$GLB,, | Performed by: FAMILY MEDICINE

## 2021-11-03 PROCEDURE — 84443 ASSAY THYROID STIM HORMONE: CPT | Performed by: FAMILY MEDICINE

## 2021-11-03 PROCEDURE — 36415 COLL VENOUS BLD VENIPUNCTURE: CPT | Mod: PO | Performed by: FAMILY MEDICINE

## 2021-11-03 PROCEDURE — 1159F MED LIST DOCD IN RCRD: CPT | Mod: CPTII,S$GLB,, | Performed by: FAMILY MEDICINE

## 2021-11-03 PROCEDURE — 99396 PREV VISIT EST AGE 40-64: CPT | Mod: S$GLB,,, | Performed by: FAMILY MEDICINE

## 2021-11-03 PROCEDURE — 99396 PR PREVENTIVE VISIT,EST,40-64: ICD-10-PCS | Mod: S$GLB,,, | Performed by: FAMILY MEDICINE

## 2021-11-03 PROCEDURE — 1159F PR MEDICATION LIST DOCUMENTED IN MEDICAL RECORD: ICD-10-PCS | Mod: CPTII,S$GLB,, | Performed by: FAMILY MEDICINE

## 2021-11-03 PROCEDURE — 80061 LIPID PANEL: CPT | Performed by: FAMILY MEDICINE

## 2021-11-03 PROCEDURE — 82306 VITAMIN D 25 HYDROXY: CPT | Performed by: FAMILY MEDICINE

## 2021-11-03 PROCEDURE — 3008F PR BODY MASS INDEX (BMI) DOCUMENTED: ICD-10-PCS | Mod: CPTII,S$GLB,, | Performed by: FAMILY MEDICINE

## 2021-11-03 PROCEDURE — 80053 COMPREHEN METABOLIC PANEL: CPT | Performed by: FAMILY MEDICINE

## 2021-11-04 ENCOUNTER — TELEPHONE (OUTPATIENT)
Dept: FAMILY MEDICINE | Facility: CLINIC | Age: 51
End: 2021-11-04
Payer: COMMERCIAL

## 2021-11-04 DIAGNOSIS — D50.0 IRON DEFICIENCY ANEMIA DUE TO CHRONIC BLOOD LOSS: Primary | ICD-10-CM

## 2021-11-04 DIAGNOSIS — E55.9 VITAMIN D DEFICIENCY: ICD-10-CM

## 2021-11-04 DIAGNOSIS — R73.01 ELEVATED FASTING GLUCOSE: ICD-10-CM

## 2021-11-04 RX ORDER — LANOLIN ALCOHOL/MO/W.PET/CERES
1 CREAM (GRAM) TOPICAL
Qty: 90 TABLET | Refills: 5 | Status: SHIPPED | OUTPATIENT
Start: 2021-11-04 | End: 2023-03-21

## 2021-11-04 RX ORDER — ERGOCALCIFEROL 1.25 MG/1
50000 CAPSULE ORAL
Qty: 12 CAPSULE | Refills: 1 | Status: SHIPPED | OUTPATIENT
Start: 2021-11-04 | End: 2023-03-21

## 2021-11-15 ENCOUNTER — PATIENT MESSAGE (OUTPATIENT)
Dept: ADMINISTRATIVE | Facility: HOSPITAL | Age: 51
End: 2021-11-15
Payer: COMMERCIAL

## 2021-12-16 ENCOUNTER — PATIENT OUTREACH (OUTPATIENT)
Dept: ADMINISTRATIVE | Facility: OTHER | Age: 51
End: 2021-12-16
Payer: COMMERCIAL

## 2021-12-20 ENCOUNTER — HOSPITAL ENCOUNTER (OUTPATIENT)
Dept: RADIOLOGY | Facility: HOSPITAL | Age: 51
Discharge: HOME | End: 2021-12-20
Attending: FAMILY MEDICINE
Payer: COMMERCIAL

## 2021-12-20 ENCOUNTER — OFFICE VISIT (OUTPATIENT)
Dept: OBSTETRICS AND GYNECOLOGY | Facility: CLINIC | Age: 51
End: 2021-12-20
Payer: COMMERCIAL

## 2021-12-20 VITALS
SYSTOLIC BLOOD PRESSURE: 115 MMHG | BODY MASS INDEX: 28.35 KG/M2 | WEIGHT: 162.63 LBS | DIASTOLIC BLOOD PRESSURE: 80 MMHG

## 2021-12-20 DIAGNOSIS — Z12.4 CERVICAL CANCER SCREENING: ICD-10-CM

## 2021-12-20 DIAGNOSIS — Z12.31 ENCOUNTER FOR SCREENING MAMMOGRAM FOR MALIGNANT NEOPLASM OF BREAST: ICD-10-CM

## 2021-12-20 DIAGNOSIS — Z01.419 ENCOUNTER FOR ANNUAL ROUTINE GYNECOLOGICAL EXAMINATION: Primary | ICD-10-CM

## 2021-12-20 DIAGNOSIS — N85.2 ENLARGED UTERUS: ICD-10-CM

## 2021-12-20 DIAGNOSIS — N95.1 PERIMENOPAUSAL: ICD-10-CM

## 2021-12-20 DIAGNOSIS — N84.1 CERVICAL POLYP: ICD-10-CM

## 2021-12-20 PROCEDURE — 88175 CYTOPATH C/V AUTO FLUID REDO: CPT | Performed by: PATHOLOGY

## 2021-12-20 PROCEDURE — 99386 PR PREVENTIVE VISIT,NEW,40-64: ICD-10-PCS | Mod: S$GLB,,, | Performed by: OBSTETRICS & GYNECOLOGY

## 2021-12-20 PROCEDURE — 88305 TISSUE EXAM BY PATHOLOGIST: CPT | Mod: 26,,, | Performed by: PATHOLOGY

## 2021-12-20 PROCEDURE — 88305 TISSUE EXAM BY PATHOLOGIST: CPT | Performed by: PATHOLOGY

## 2021-12-20 PROCEDURE — 88141 CYTOPATH C/V INTERPRET: CPT | Mod: ,,, | Performed by: PATHOLOGY

## 2021-12-20 PROCEDURE — 88141 PR  CYTOPATH CERV/VAG INTERPRET: ICD-10-PCS | Mod: ,,, | Performed by: PATHOLOGY

## 2021-12-20 PROCEDURE — 99999 PR PBB SHADOW E&M-EST. PATIENT-LVL III: ICD-10-PCS | Mod: PBBFAC,,, | Performed by: OBSTETRICS & GYNECOLOGY

## 2021-12-20 PROCEDURE — 99999 PR PBB SHADOW E&M-EST. PATIENT-LVL III: CPT | Mod: PBBFAC,,, | Performed by: OBSTETRICS & GYNECOLOGY

## 2021-12-20 PROCEDURE — 99386 PREV VISIT NEW AGE 40-64: CPT | Mod: S$GLB,,, | Performed by: OBSTETRICS & GYNECOLOGY

## 2021-12-20 PROCEDURE — 88305 TISSUE EXAM BY PATHOLOGIST: ICD-10-PCS | Mod: 26,,, | Performed by: PATHOLOGY

## 2021-12-27 ENCOUNTER — PATIENT MESSAGE (OUTPATIENT)
Dept: OBSTETRICS AND GYNECOLOGY | Facility: CLINIC | Age: 51
End: 2021-12-27
Payer: COMMERCIAL

## 2021-12-27 ENCOUNTER — TELEPHONE (OUTPATIENT)
Dept: OBSTETRICS AND GYNECOLOGY | Facility: CLINIC | Age: 51
End: 2021-12-27
Payer: COMMERCIAL

## 2021-12-27 LAB
FINAL PATHOLOGIC DIAGNOSIS: NORMAL
Lab: NORMAL

## 2021-12-28 ENCOUNTER — TELEPHONE (OUTPATIENT)
Dept: OBSTETRICS AND GYNECOLOGY | Facility: CLINIC | Age: 51
End: 2021-12-28
Payer: COMMERCIAL

## 2021-12-30 ENCOUNTER — TELEPHONE (OUTPATIENT)
Dept: OBSTETRICS AND GYNECOLOGY | Facility: CLINIC | Age: 51
End: 2021-12-30
Payer: COMMERCIAL

## 2021-12-30 LAB
FINAL PATHOLOGIC DIAGNOSIS: NORMAL
Lab: NORMAL

## 2022-01-03 ENCOUNTER — PATIENT MESSAGE (OUTPATIENT)
Dept: OBSTETRICS AND GYNECOLOGY | Facility: CLINIC | Age: 52
End: 2022-01-03
Payer: MEDICAID

## 2022-01-04 NOTE — TELEPHONE ENCOUNTER
Called pt. Pt declined using an . She said her son was going to interpret. Told pt her results. Pt did not have any questions, verbalized understanding.

## 2022-05-31 ENCOUNTER — PATIENT MESSAGE (OUTPATIENT)
Dept: ADMINISTRATIVE | Facility: HOSPITAL | Age: 52
End: 2022-05-31

## 2022-12-28 ENCOUNTER — OFFICE VISIT (OUTPATIENT)
Dept: FAMILY MEDICINE | Facility: CLINIC | Age: 52
End: 2022-12-28

## 2022-12-28 VITALS
SYSTOLIC BLOOD PRESSURE: 114 MMHG | TEMPERATURE: 98 F | HEIGHT: 63 IN | BODY MASS INDEX: 28.12 KG/M2 | OXYGEN SATURATION: 98 % | DIASTOLIC BLOOD PRESSURE: 79 MMHG | HEART RATE: 75 BPM | WEIGHT: 158.69 LBS

## 2022-12-28 DIAGNOSIS — J20.9 ACUTE BRONCHITIS, UNSPECIFIED ORGANISM: Primary | ICD-10-CM

## 2022-12-28 PROCEDURE — 99214 PR OFFICE/OUTPT VISIT, EST, LEVL IV, 30-39 MIN: ICD-10-PCS | Mod: S$PBB,,, | Performed by: NURSE PRACTITIONER

## 2022-12-28 PROCEDURE — 99214 OFFICE O/P EST MOD 30 MIN: CPT | Mod: PBBFAC,PN | Performed by: NURSE PRACTITIONER

## 2022-12-28 PROCEDURE — 99999 PR PBB SHADOW E&M-EST. PATIENT-LVL IV: CPT | Mod: PBBFAC,,, | Performed by: NURSE PRACTITIONER

## 2022-12-28 PROCEDURE — 99214 OFFICE O/P EST MOD 30 MIN: CPT | Mod: S$PBB,,, | Performed by: NURSE PRACTITIONER

## 2022-12-28 PROCEDURE — 99999 PR PBB SHADOW E&M-EST. PATIENT-LVL IV: ICD-10-PCS | Mod: PBBFAC,,, | Performed by: NURSE PRACTITIONER

## 2022-12-28 RX ORDER — AZITHROMYCIN 250 MG/1
TABLET, FILM COATED ORAL
Qty: 6 TABLET | Refills: 0 | Status: SHIPPED | OUTPATIENT
Start: 2022-12-28 | End: 2023-01-02

## 2022-12-28 RX ORDER — ALBUTEROL SULFATE 90 UG/1
2 AEROSOL, METERED RESPIRATORY (INHALATION) EVERY 6 HOURS PRN
Qty: 18 G | Refills: 0 | Status: SHIPPED | OUTPATIENT
Start: 2022-12-28 | End: 2023-03-21

## 2022-12-28 RX ORDER — METHYLPREDNISOLONE 4 MG/1
TABLET ORAL
Qty: 21 EACH | Refills: 0 | Status: SHIPPED | OUTPATIENT
Start: 2022-12-28 | End: 2023-01-18

## 2022-12-28 RX ORDER — CODEINE PHOSPHATE AND GUAIFENESIN 10; 100 MG/5ML; MG/5ML
5 SOLUTION ORAL EVERY 6 HOURS PRN
Qty: 120 ML | Refills: 0 | Status: SHIPPED | OUTPATIENT
Start: 2022-12-28 | End: 2023-03-21

## 2022-12-28 NOTE — PROGRESS NOTES
Subjective:       Patient ID: Madelin Hidalgo is a 52 y.o. female.    Chief Complaint: Cough    53 y/o female presents to clinic for urgent care visit with c/o cough x 2 weeks.      services via [employer] line services.    Cough  This is a new problem. The current episode started 1 to 4 weeks ago. The problem has been gradually worsening. The problem occurs every few minutes. The cough is Non-productive. Associated symptoms include wheezing. Pertinent negatives include no chest pain, chills, ear congestion, fever, headaches, hemoptysis, myalgias, nasal congestion, postnasal drip, rhinorrhea, sore throat, shortness of breath or sweats. Nothing aggravates the symptoms. She has tried OTC cough suppressant for the symptoms. The treatment provided mild relief. There is no history of asthma, bronchitis or environmental allergies.     Current Outpatient Medications   Medication Sig Dispense Refill    albuterol (VENTOLIN HFA) 90 mcg/actuation inhaler Inhale 2 puffs into the lungs every 6 (six) hours as needed for Wheezing. Rescue 18 g 0    azithromycin (Z-LORRAINE) 250 MG tablet Take 2 tablets by mouth on day 1; Take 1 tablet by mouth on days 2-5 6 tablet 0    ergocalciferol (ERGOCALCIFEROL) 50,000 unit Cap Take 1 capsule (50,000 Units total) by mouth every 7 days. Then start daily OTC replacement after this Rx is complete (Patient not taking: Reported on 12/28/2022) 12 capsule 1    ferrous sulfate (FEOSOL) Tab tablet Take 1 tablet (1 each total) by mouth daily with breakfast. (Patient not taking: Reported on 12/28/2022) 90 tablet 5    guaiFENesin-codeine 100-10 mg/5 ml (TUSSI-ORGANIDIN NR)  mg/5 mL syrup Take 5 mLs by mouth every 6 (six) hours as needed for Cough. 120 mL 0    methylPREDNISolone (MEDROL DOSEPACK) 4 mg tablet use as directed 21 each 0     No current facility-administered medications for this visit.       Past Medical History:   Diagnosis Date    Ectopic pregnancy     Iron deficiency        Past  "Surgical History:   Procedure Laterality Date    THYROID SURGERY      unclear what kind, she is unsure       Family History   Problem Relation Age of Onset    Hypertension Mother     No Known Problems Father     Esophageal cancer Paternal Grandfather     No Known Problems Sister     No Known Problems Brother     No Known Problems Brother     No Known Problems Sister     Breast cancer Neg Hx     Colon cancer Neg Hx     Ovarian cancer Neg Hx        Social History     Socioeconomic History    Marital status:    Tobacco Use    Smoking status: Never    Smokeless tobacco: Never   Substance and Sexual Activity    Alcohol use: No    Drug use: No    Sexual activity: Yes     Partners: Male     Birth control/protection: None   Social History Narrative    11/3/2021: She lives with her  and two children. Her  is also a patient of mine. There are no pets at home. 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        Review of Systems   Constitutional:  Negative for chills and fever.   HENT:  Negative for postnasal drip, rhinorrhea and sore throat.    Respiratory:  Positive for cough and wheezing. Negative for hemoptysis and shortness of breath.    Cardiovascular:  Negative for chest pain.   Musculoskeletal:  Negative for myalgias.   Allergic/Immunologic: Negative for environmental allergies.   Neurological:  Negative for headaches.       Objective:     Vitals:    12/28/22 0951   BP: 114/79   BP Location: Left arm   Patient Position: Sitting   BP Method: Large (Automatic)   Pulse: 75   Temp: 98.3 °F (36.8 °C)   TempSrc: Oral   SpO2: 98%   Weight: 72 kg (158 lb 11.2 oz)   Height: 5' 3" (1.6 m)          Physical Exam  Constitutional:       General: She is not in acute distress.     Appearance: Normal appearance.   HENT:      Head: Normocephalic.      Right Ear: Tympanic membrane and ear canal normal.      Left Ear: Tympanic membrane and ear canal normal.      Nose: Nose normal.   Eyes: "      Conjunctiva/sclera: Conjunctivae normal.      Pupils: Pupils are equal, round, and reactive to light.   Cardiovascular:      Rate and Rhythm: Normal rate and regular rhythm.   Pulmonary:      Effort: Pulmonary effort is normal.      Breath sounds: Normal breath sounds. No wheezing.      Comments: coughing  Musculoskeletal:         General: Normal range of motion.      Cervical back: Normal range of motion and neck supple.   Lymphadenopathy:      Cervical: No cervical adenopathy.   Skin:     General: Skin is warm and dry.   Neurological:      Mental Status: She is alert and oriented to person, place, and time.   Psychiatric:         Mood and Affect: Mood normal.         Behavior: Behavior normal.         Assessment:         ICD-10-CM ICD-9-CM   1. Acute bronchitis, unspecified organism  J20.9 466.0       Plan:       Acute bronchitis, unspecified organism  -     azithromycin (Z-LORRAINE) 250 MG tablet; Take 2 tablets by mouth on day 1; Take 1 tablet by mouth on days 2-5  Dispense: 6 tablet; Refill: 0  -     methylPREDNISolone (MEDROL DOSEPACK) 4 mg tablet; use as directed  Dispense: 21 each; Refill: 0  -     albuterol (VENTOLIN HFA) 90 mcg/actuation inhaler; Inhale 2 puffs into the lungs every 6 (six) hours as needed for Wheezing. Rescue  Dispense: 18 g; Refill: 0  -     guaiFENesin-codeine 100-10 mg/5 ml (TUSSI-ORGANIDIN NR)  mg/5 mL syrup; Take 5 mLs by mouth every 6 (six) hours as needed for Cough.  Dispense: 120 mL; Refill: 0      Follow up if symptoms worsen or fail to improve.     Patient's Medications   New Prescriptions    ALBUTEROL (VENTOLIN HFA) 90 MCG/ACTUATION INHALER    Inhale 2 puffs into the lungs every 6 (six) hours as needed for Wheezing. Rescue    AZITHROMYCIN (Z-LORRAINE) 250 MG TABLET    Take 2 tablets by mouth on day 1; Take 1 tablet by mouth on days 2-5    GUAIFENESIN-CODEINE 100-10 MG/5 ML (TUSSI-ORGANIDIN NR)  MG/5 ML SYRUP    Take 5 mLs by mouth every 6 (six) hours as needed for Cough.     METHYLPREDNISOLONE (MEDROL DOSEPACK) 4 MG TABLET    use as directed   Previous Medications    ERGOCALCIFEROL (ERGOCALCIFEROL) 50,000 UNIT CAP    Take 1 capsule (50,000 Units total) by mouth every 7 days. Then start daily OTC replacement after this Rx is complete    FERROUS SULFATE (FEOSOL) TAB TABLET    Take 1 tablet (1 each total) by mouth daily with breakfast.   Modified Medications    No medications on file   Discontinued Medications    No medications on file

## 2023-01-25 DIAGNOSIS — Z12.31 OTHER SCREENING MAMMOGRAM: ICD-10-CM

## 2023-01-30 ENCOUNTER — PATIENT MESSAGE (OUTPATIENT)
Dept: ADMINISTRATIVE | Facility: HOSPITAL | Age: 53
End: 2023-01-30
Payer: COMMERCIAL

## 2023-02-01 ENCOUNTER — TELEPHONE (OUTPATIENT)
Dept: FAMILY MEDICINE | Facility: CLINIC | Age: 53
End: 2023-02-01
Payer: COMMERCIAL

## 2023-02-01 NOTE — TELEPHONE ENCOUNTER
----- Message from Rico Ortiz sent at 2/1/2023  2:33 PM CST -----  Contact: pt  Type:  Sooner Apoointment Request    Caller is requesting a sooner appointment.  Caller declined first available appointment listed below.  Caller will not accept being placed on the waitlist and is requesting a message be sent to doctor.  Name of Caller:pt   When is the first available appointment? N/a  Symptoms:est care  Would the patient rather a call back or a response via [facility]? Call   Best Call Back Number:[phone]  Additional Information:

## 2023-02-08 DIAGNOSIS — Z12.31 OTHER SCREENING MAMMOGRAM: ICD-10-CM

## 2023-03-04 PROCEDURE — 96360 HYDRATION IV INFUSION INIT: CPT

## 2023-03-04 PROCEDURE — 99285 EMERGENCY DEPT VISIT HI MDM: CPT | Mod: 25

## 2023-03-05 ENCOUNTER — HOSPITAL ENCOUNTER (EMERGENCY)
Facility: HOSPITAL | Age: 53
Discharge: HOME | End: 2023-03-05
Attending: EMERGENCY MEDICINE
Payer: COMMERCIAL

## 2023-03-05 VITALS
SYSTOLIC BLOOD PRESSURE: 134 MMHG | WEIGHT: 157 LBS | HEIGHT: 65 IN | OXYGEN SATURATION: 99 % | HEART RATE: 84 BPM | BODY MASS INDEX: 26.16 KG/M2 | RESPIRATION RATE: 16 BRPM | TEMPERATURE: 98 F | DIASTOLIC BLOOD PRESSURE: 82 MMHG

## 2023-03-05 DIAGNOSIS — R42 LIGHTHEADEDNESS: ICD-10-CM

## 2023-03-05 DIAGNOSIS — R53.83 FATIGUE, UNSPECIFIED TYPE: Primary | ICD-10-CM

## 2023-03-05 LAB
ALBUMIN SERPL BCP-MCNC: 3.8 G/DL (ref 3.5–5.2)
ALP SERPL-CCNC: 81 U/L (ref 55–135)
ALT SERPL W/O P-5'-P-CCNC: 27 U/L (ref 10–44)
ANION GAP SERPL CALC-SCNC: 12 MMOL/L (ref 8–16)
AST SERPL-CCNC: 23 U/L (ref 10–40)
BASOPHILS # BLD AUTO: 0.03 K/UL (ref 0–0.2)
BASOPHILS NFR BLD: 0.4 % (ref 0–1.9)
BILIRUB SERPL-MCNC: 0.4 MG/DL (ref 0.1–1)
BILIRUB UR QL STRIP: NEGATIVE
BUN SERPL-MCNC: 10 MG/DL (ref 6–20)
CALCIUM SERPL-MCNC: 9.2 MG/DL (ref 8.7–10.5)
CHLORIDE SERPL-SCNC: 107 MMOL/L (ref 95–110)
CLARITY UR: CLEAR
CO2 SERPL-SCNC: 24 MMOL/L (ref 23–29)
COLOR UR: COLORLESS
CREAT SERPL-MCNC: 0.7 MG/DL (ref 0.5–1.4)
CTP QC/QA: YES
CTP QC/QA: YES
DIFFERENTIAL METHOD: ABNORMAL
EOSINOPHIL # BLD AUTO: 0.3 K/UL (ref 0–0.5)
EOSINOPHIL NFR BLD: 3.5 % (ref 0–8)
ERYTHROCYTE [DISTWIDTH] IN BLOOD BY AUTOMATED COUNT: 12.3 % (ref 11.5–14.5)
EST. GFR  (NO RACE VARIABLE): >60 ML/MIN/1.73 M^2
GLUCOSE SERPL-MCNC: 121 MG/DL (ref 70–110)
GLUCOSE UR QL STRIP: NEGATIVE
HCT VFR BLD AUTO: 37.3 % (ref 37–48.5)
HGB BLD-MCNC: 12.2 G/DL (ref 12–16)
HGB UR QL STRIP: NEGATIVE
IMM GRANULOCYTES # BLD AUTO: 0.02 K/UL (ref 0–0.04)
IMM GRANULOCYTES NFR BLD AUTO: 0.3 % (ref 0–0.5)
KETONES UR QL STRIP: NEGATIVE
LEUKOCYTE ESTERASE UR QL STRIP: NEGATIVE
LYMPHOCYTES # BLD AUTO: 3.6 K/UL (ref 1–4.8)
LYMPHOCYTES NFR BLD: 47.7 % (ref 18–48)
MAGNESIUM SERPL-MCNC: 2.3 MG/DL (ref 1.6–2.6)
MCH RBC QN AUTO: 28.9 PG (ref 27–31)
MCHC RBC AUTO-ENTMCNC: 32.7 G/DL (ref 32–36)
MCV RBC AUTO: 88 FL (ref 82–98)
MONOCYTES # BLD AUTO: 0.8 K/UL (ref 0.3–1)
MONOCYTES NFR BLD: 10.8 % (ref 4–15)
NEUTROPHILS # BLD AUTO: 2.8 K/UL (ref 1.8–7.7)
NEUTROPHILS NFR BLD: 37.3 % (ref 38–73)
NITRITE UR QL STRIP: NEGATIVE
NRBC BLD-RTO: 0 /100 WBC
PH UR STRIP: 7 [PH] (ref 5–8)
PLATELET # BLD AUTO: 293 K/UL (ref 150–450)
PMV BLD AUTO: 10 FL (ref 9.2–12.9)
POC MOLECULAR INFLUENZA A AGN: NEGATIVE
POC MOLECULAR INFLUENZA B AGN: NEGATIVE
POTASSIUM SERPL-SCNC: 3.7 MMOL/L (ref 3.5–5.1)
PROT SERPL-MCNC: 7.7 G/DL (ref 6–8.4)
PROT UR QL STRIP: NEGATIVE
RBC # BLD AUTO: 4.22 M/UL (ref 4–5.4)
SARS-COV-2 RDRP RESP QL NAA+PROBE: NEGATIVE
SODIUM SERPL-SCNC: 143 MMOL/L (ref 136–145)
SP GR UR STRIP: 1 (ref 1–1.03)
TROPONIN I SERPL DL<=0.01 NG/ML-MCNC: <0.006 NG/ML (ref 0–0.03)
URN SPEC COLLECT METH UR: ABNORMAL
UROBILINOGEN UR STRIP-ACNC: NEGATIVE EU/DL
WBC # BLD AUTO: 7.61 K/UL (ref 3.9–12.7)

## 2023-03-05 PROCEDURE — 63600175 PHARM REV CODE 636 W HCPCS: Performed by: EMERGENCY MEDICINE

## 2023-03-05 PROCEDURE — 25000003 PHARM REV CODE 250: Performed by: EMERGENCY MEDICINE

## 2023-03-05 PROCEDURE — 83735 ASSAY OF MAGNESIUM: CPT | Performed by: EMERGENCY MEDICINE

## 2023-03-05 PROCEDURE — 85025 COMPLETE CBC W/AUTO DIFF WBC: CPT | Performed by: EMERGENCY MEDICINE

## 2023-03-05 PROCEDURE — 81003 URINALYSIS AUTO W/O SCOPE: CPT | Performed by: EMERGENCY MEDICINE

## 2023-03-05 PROCEDURE — 80053 COMPREHEN METABOLIC PANEL: CPT | Performed by: EMERGENCY MEDICINE

## 2023-03-05 PROCEDURE — 84484 ASSAY OF TROPONIN QUANT: CPT | Performed by: EMERGENCY MEDICINE

## 2023-03-05 RX ORDER — ONDANSETRON 4 MG/1
4 TABLET, FILM COATED ORAL EVERY 6 HOURS
Qty: 8 TABLET | Refills: 0 | Status: SHIPPED | OUTPATIENT
Start: 2023-03-05 | End: 2023-03-07

## 2023-03-05 RX ORDER — MECLIZINE HYDROCHLORIDE 25 MG/1
50 TABLET ORAL
Status: COMPLETED | OUTPATIENT
Start: 2023-03-05 | End: 2023-03-05

## 2023-03-05 RX ADMIN — SODIUM CHLORIDE, SODIUM LACTATE, POTASSIUM CHLORIDE, AND CALCIUM CHLORIDE 1000 ML: .6; .31; .03; .02 INJECTION, SOLUTION INTRAVENOUS at 02:03

## 2023-03-05 RX ADMIN — MECLIZINE HYDROCHLORIDE 50 MG: 25 TABLET ORAL at 04:03

## 2023-03-05 NOTE — DISCHARGE INSTRUCTIONS

## 2023-03-05 NOTE — ED PROVIDER NOTES
Encounter Date: 3/4/2023       History     Chief Complaint   Patient presents with    Dizziness    Nausea    Diarrhea     Nausea w/o vomiting, dizziness, and diarrhea x 5 days. Reports having fever x 1 day w/ body aches last week, denies fever and pain at this time. Also c/o feeling fatigued.     52-year-old female with no significant medical problems presents with complaint of generalized malaise, lightheadedness.  Patient says that over the past week she is had ongoing fatigue, lightheadedness when walking and associated nausea.  Denies syncope, vertigo, chest pain, abdominal pain, vomiting.  Says that she had ill contacts from her daughter who was diagnosed with flu.  Denies extremity numbness, tingling or weakness.  Her daughter was worried that she might have anemia.    The history is provided by the patient and a relative. The history is limited by a language barrier. A  was used.   Review of patient's allergies indicates:  No Known Allergies  No past medical history on file.  No past surgical history on file.  No family history on file.     Review of Systems   Constitutional:  Positive for fatigue.   Neurological:  Positive for light-headedness.     Physical Exam     Initial Vitals [03/04/23 2330]   BP Pulse Resp Temp SpO2   (!) 140/83 79 20 98.4 °F (36.9 °C) 99 %      MAP       --         Physical Exam    Nursing note and vitals reviewed.  Constitutional: She appears well-developed. She is not diaphoretic. No distress.   HENT:   Head: Normocephalic and atraumatic.   Mouth/Throat: Oropharynx is clear and moist.   Eyes: Conjunctivae and EOM are normal. Pupils are equal, round, and reactive to light. Right eye exhibits no discharge. Left eye exhibits no discharge. No scleral icterus.   Neck: Neck supple.   Normal range of motion.  Cardiovascular:  Normal rate.           Pulmonary/Chest: Breath sounds normal. No respiratory distress. She has no wheezes. She exhibits no tenderness.  "  Abdominal: Abdomen is soft. She exhibits no distension. There is no abdominal tenderness.   Musculoskeletal:         General: Normal range of motion.      Cervical back: Normal range of motion and neck supple.     Neurological: She is alert and oriented to person, place, and time. She has normal strength. No cranial nerve deficit or sensory deficit.   Stable gait.  Negative Romberg.   Skin: Skin is warm and dry. No rash noted.   Psychiatric: She has a normal mood and affect.       ED Course   Procedures  Labs Reviewed   URINALYSIS, REFLEX TO URINE CULTURE - Abnormal; Notable for the following components:       Result Value    Color, UA Colorless (*)     All other components within normal limits    Narrative:     Specimen Source->Urine   CBC W/ AUTO DIFFERENTIAL - Abnormal; Notable for the following components:    Gran % 37.3 (*)     All other components within normal limits   COMPREHENSIVE METABOLIC PANEL - Abnormal; Notable for the following components:    Glucose 121 (*)     All other components within normal limits   MAGNESIUM   TROPONIN I   POCT INFLUENZA A/B MOLECULAR   SARS-COV-2 RDRP GENE     EKG Readings: (Independently Interpreted)   Initial Reading: No STEMI. Rhythm: Normal Sinus Rhythm. Heart Rate: 68. Ectopy: No Ectopy. Conduction: Normal. Axis: Normal. Clinical Impression: Normal Sinus Rhythm     Imaging Results              X-Ray Chest AP Portable (Final result)  Result time 03/05/23 03:54:19      Final result by Jimenez Weber MD (03/05/23 03:54:19)                   Impression:      No acute process identified on this limited single view.      Electronically signed by: Jimenez Weber MD  Date:    03/05/2023  Time:    03:54               Narrative:    EXAMINATION:  XR CHEST AP PORTABLE    CLINICAL HISTORY:  Provided history is "  Dizziness and giddiness".    TECHNIQUE:  One view of the chest.    COMPARISON:  None.    FINDINGS:  Cardiac wires overlie the chest.  Cardiomediastinal silhouette is " magnified by portable technique and may be at the upper limits of normal in size.  Lung volumes are relatively low.  There are prominent perihilar interstitial lung markings but no large focal area of consolidation.  No sizable pleural effusion.  No pneumothorax.                                       Medications   lactated ringers bolus 1,000 mL (0 mLs Intravenous Stopped 3/5/23 5628)     Medical Decision Making:   History:   Old Records Summarized: records from clinic visits.       <> Summary of Records: [facility], most recently saw her primary care doctor in December when she had bronchitis.  Differential Diagnosis:   Anemia, pneumonia, COVID, flu, viral infection, RENE, electrolyte abnormality  Clinical Tests:   Lab Tests: Ordered and Reviewed  Radiological Study: Ordered and Reviewed  Medical Tests: Ordered and Reviewed  ED Management:  52-year-old female with no significant past medical history presents with complaint of 1 week generalized malaise.  Upon arrival she is afebrile, stable vital signs.  No focal neurological deficits.  Stable gait.  Chest x-ray without infiltrate.  No acute lab abnormalities.  Urine without evidence of infection.  Normal troponin, normal renal function.  Normal hemoglobin.  Instructed to follow up outpatient with her primary care doctor.  Already has an appointment scheduled for March 22nd but was told her to call on Monday to attempt to expedite this given strict return precautions including development of fever, pain, vomiting, focal weakness, or other concerning signs or symptoms.    No acute emergent medical condition has been identified. The patient appears to be low risk for an emergent medical condition is appropriate for discharge with outpatient f/u as detailed in discharge instructions for reevaluation and possible continued outpatient workup and management. I have discussed the workup with the patient, who has verbalized understanding  of the plan and need for outpatient follow-up.  This evaluation does not preclude the development of an emergent condition so in addition, I have advised the patient that they can return to the ED at any time with worsening or change of their symptoms, or with any other medical complaint.                           Clinical Impression:   Final diagnoses:  [R42] Lightheadedness  [R53.83] Fatigue, unspecified type (Primary)        ED Disposition Condition    Discharge Stable          ED Prescriptions    None       Follow-up Information       Follow up With Specialties Details Why Contact [facility] - Emergency Dept Emergency Medicine  As needed, If symptoms worsen [address] [id number] [phone]    Your primary care physician  In 3 days                      Birgit Rudolph MD  03/05/23 6128

## 2023-03-05 NOTE — ED NOTES
52 y.o. female to ED with c.o. dizziness with associated nausea and vomiting x 5 days, dizziness and nausea are worse with standing. ambulating. Patient states she has been having a few episodes of diarrhea every day for the past 3 days. Patient endorses subjective fever at home with generalized body aches and weakness x 1 week. Family member states the patient has been having to hold onto the wall or something to ambulate. Patient denies abdominal pain, denies chest pain/ cough/ shortness of breath, denies all other medical complaints at this time. Patient awake, alert, and oriented x 4. No apparent distress noted. VS currently stable. Patient assisted onto stretcher and changed into a gown. Patient placed on cardiac monitor, continuous pulse oximetry and automatic blood pressure cuff. Bed placed in low locked position, side rails up x 2, call light is within reach of patient orientation to room and explanation of wait provided to patient, alarms set and turned on for monitor and pulse ox, awaiting MD evaluation and orders, will continue to monitor.

## 2023-03-07 ENCOUNTER — PATIENT MESSAGE (OUTPATIENT)
Dept: ADMINISTRATIVE | Facility: HOSPITAL | Age: 53
End: 2023-03-07
Payer: COMMERCIAL

## 2023-03-07 ENCOUNTER — PATIENT OUTREACH (OUTPATIENT)
Dept: ADMINISTRATIVE | Facility: HOSPITAL | Age: 53
End: 2023-03-07
Payer: COMMERCIAL

## 2023-03-07 NOTE — PROGRESS NOTES
Care Everywhere updates requested and reviewed.  Immunizations reconciled. Media reports reviewed.  Duplicate HM overrides and  orders removed.  Overdue HM topic chart audit and/or requested.  Overdue lab testing linked to upcoming lab appointments if applies.          Health Maintenance Due   Topic Date Due    Colorectal Cancer Screening  Never done    Shingles Vaccine (1 of 2) Never done    COVID-19 Vaccine (4 - Booster for Pfizer series) 2022    Influenza Vaccine (1) 2022    Hemoglobin A1c (Prediabetes)  2022    Mammogram  2023

## 2023-03-21 ENCOUNTER — OFFICE VISIT (OUTPATIENT)
Dept: FAMILY MEDICINE | Facility: CLINIC | Age: 53
End: 2023-03-21
Payer: COMMERCIAL

## 2023-03-21 VITALS
SYSTOLIC BLOOD PRESSURE: 118 MMHG | HEART RATE: 72 BPM | OXYGEN SATURATION: 99 % | BODY MASS INDEX: 27.03 KG/M2 | HEIGHT: 65 IN | TEMPERATURE: 98 F | WEIGHT: 162.25 LBS | DIASTOLIC BLOOD PRESSURE: 80 MMHG

## 2023-03-21 DIAGNOSIS — Z12.11 SCREENING FOR MALIGNANT NEOPLASM OF COLON: ICD-10-CM

## 2023-03-21 DIAGNOSIS — Z86.39 HISTORY OF IRON DEFICIENCY: ICD-10-CM

## 2023-03-21 DIAGNOSIS — R42 VERTIGO: ICD-10-CM

## 2023-03-21 DIAGNOSIS — Z86.39 H/O THYROID NODULE: ICD-10-CM

## 2023-03-21 DIAGNOSIS — Z13.6 ENCOUNTER FOR SCREENING FOR CARDIOVASCULAR DISORDERS: ICD-10-CM

## 2023-03-21 DIAGNOSIS — Z00.00 WELLNESS EXAMINATION: ICD-10-CM

## 2023-03-21 DIAGNOSIS — H69.93 DYSFUNCTION OF BOTH EUSTACHIAN TUBES: ICD-10-CM

## 2023-03-21 DIAGNOSIS — R73.01 ELEVATED FASTING GLUCOSE: ICD-10-CM

## 2023-03-21 DIAGNOSIS — Z76.89 ENCOUNTER TO ESTABLISH CARE WITH NEW DOCTOR: Primary | ICD-10-CM

## 2023-03-21 PROBLEM — Z91.09 ENVIRONMENTAL ALLERGIES: Status: RESOLVED | Noted: 2018-06-25 | Resolved: 2023-03-21

## 2023-03-21 PROBLEM — Z87.59 HISTORY OF ECTOPIC PREGNANCY: Status: RESOLVED | Noted: 2018-06-25 | Resolved: 2023-03-21

## 2023-03-21 PROBLEM — K64.8 OTHER HEMORRHOIDS: Status: RESOLVED | Noted: 2019-07-10 | Resolved: 2023-03-21

## 2023-03-21 PROCEDURE — 99999 PR PBB SHADOW E&M-EST. PATIENT-LVL IV: CPT | Mod: PBBFAC,,, | Performed by: STUDENT IN AN ORGANIZED HEALTH CARE EDUCATION/TRAINING PROGRAM

## 2023-03-21 PROCEDURE — 3008F BODY MASS INDEX DOCD: CPT | Mod: CPTII,S$GLB,, | Performed by: STUDENT IN AN ORGANIZED HEALTH CARE EDUCATION/TRAINING PROGRAM

## 2023-03-21 PROCEDURE — 1159F PR MEDICATION LIST DOCUMENTED IN MEDICAL RECORD: ICD-10-PCS | Mod: CPTII,S$GLB,, | Performed by: STUDENT IN AN ORGANIZED HEALTH CARE EDUCATION/TRAINING PROGRAM

## 2023-03-21 PROCEDURE — 1160F RVW MEDS BY RX/DR IN RCRD: CPT | Mod: CPTII,S$GLB,, | Performed by: STUDENT IN AN ORGANIZED HEALTH CARE EDUCATION/TRAINING PROGRAM

## 2023-03-21 PROCEDURE — 1159F MED LIST DOCD IN RCRD: CPT | Mod: CPTII,S$GLB,, | Performed by: STUDENT IN AN ORGANIZED HEALTH CARE EDUCATION/TRAINING PROGRAM

## 2023-03-21 PROCEDURE — 3008F PR BODY MASS INDEX (BMI) DOCUMENTED: ICD-10-PCS | Mod: CPTII,S$GLB,, | Performed by: STUDENT IN AN ORGANIZED HEALTH CARE EDUCATION/TRAINING PROGRAM

## 2023-03-21 PROCEDURE — 3074F PR MOST RECENT SYSTOLIC BLOOD PRESSURE < 130 MM HG: ICD-10-PCS | Mod: CPTII,S$GLB,, | Performed by: STUDENT IN AN ORGANIZED HEALTH CARE EDUCATION/TRAINING PROGRAM

## 2023-03-21 PROCEDURE — 99999 PR PBB SHADOW E&M-EST. PATIENT-LVL IV: ICD-10-PCS | Mod: PBBFAC,,, | Performed by: STUDENT IN AN ORGANIZED HEALTH CARE EDUCATION/TRAINING PROGRAM

## 2023-03-21 PROCEDURE — 1160F PR REVIEW ALL MEDS BY PRESCRIBER/CLIN PHARMACIST DOCUMENTED: ICD-10-PCS | Mod: CPTII,S$GLB,, | Performed by: STUDENT IN AN ORGANIZED HEALTH CARE EDUCATION/TRAINING PROGRAM

## 2023-03-21 PROCEDURE — 3074F SYST BP LT 130 MM HG: CPT | Mod: CPTII,S$GLB,, | Performed by: STUDENT IN AN ORGANIZED HEALTH CARE EDUCATION/TRAINING PROGRAM

## 2023-03-21 PROCEDURE — 3079F DIAST BP 80-89 MM HG: CPT | Mod: CPTII,S$GLB,, | Performed by: STUDENT IN AN ORGANIZED HEALTH CARE EDUCATION/TRAINING PROGRAM

## 2023-03-21 PROCEDURE — 99396 PREV VISIT EST AGE 40-64: CPT | Mod: S$GLB,,, | Performed by: STUDENT IN AN ORGANIZED HEALTH CARE EDUCATION/TRAINING PROGRAM

## 2023-03-21 PROCEDURE — 3079F PR MOST RECENT DIASTOLIC BLOOD PRESSURE 80-89 MM HG: ICD-10-PCS | Mod: CPTII,S$GLB,, | Performed by: STUDENT IN AN ORGANIZED HEALTH CARE EDUCATION/TRAINING PROGRAM

## 2023-03-21 PROCEDURE — 99396 PR PREVENTIVE VISIT,EST,40-64: ICD-10-PCS | Mod: S$GLB,,, | Performed by: STUDENT IN AN ORGANIZED HEALTH CARE EDUCATION/TRAINING PROGRAM

## 2023-03-21 RX ORDER — MECLIZINE HYDROCHLORIDE 25 MG/1
25 TABLET ORAL 3 TIMES DAILY PRN
Qty: 30 TABLET | Refills: 1 | Status: SHIPPED | OUTPATIENT
Start: 2023-03-21 | End: 2023-03-21

## 2023-03-21 RX ORDER — MECLIZINE HYDROCHLORIDE 25 MG/1
25 TABLET ORAL 3 TIMES DAILY PRN
Qty: 30 TABLET | Refills: 1 | Status: SHIPPED | OUTPATIENT
Start: 2023-03-21 | End: 2024-02-26

## 2023-03-21 NOTE — PROGRESS NOTES
Subjective:       Patient ID: Madelin Hidalgo is a 52 y.o. female.    Chief Complaint: Establish Care (Pt here est care)      Active Problem List with Overview Notes    Diagnosis Date Noted    Dysfunction of both eustachian tubes 03/21/2023     + dizziness  Since having COVID last year more URIs and feels like constant runny nose      Vertigo 03/21/2023     Room spinning sensation   Worse lately   With nausea and vomiting at times  With fast head movements   Takes Tylenol   Not been to ENT  Has been given RX in past she does not know the name and it helped       Elevated fasting glucose 11/04/2021     Due for labs  Diet controlled      Vitamin D deficiency 11/02/2021    History of iron deficiency 10/11/2019     When younger   Asymptomatic now  No longer taking oral iron   2019 iron infusion with Mitch  + dizziness with nausea  No longer having cycles       H/O thyroid nodule 06/25/2018     S/p resection   Denies symptoms   No f/u US; may want to consider going forward  needs labs          Review of Systems   Gastrointestinal:  Positive for nausea and vomiting.   Neurological:  Positive for dizziness and light-headedness.   All other systems reviewed and are negative.     A1C:  Recent Labs   Lab 08/03/20  0919 11/03/21  0945   Hemoglobin A1C 5.4 5.9 H     CBC:  Recent Labs   Lab 07/10/21  0909 11/03/21  0945 03/05/23  0240   WBC 10.14 7.41 7.61   RBC 4.32 4.35 4.22   Hemoglobin 11.5 L 11.2 L 12.2   Hematocrit 36.6 L 37.5 37.3   Platelets 324 398 293   MCV 85 86 88   MCH 26.6 L 25.7 L 28.9   MCHC 31.4 L 29.9 L 32.7     CMP:  Recent Labs   Lab 07/10/21  0909 11/03/21  0945 03/05/23  0240   Glucose 104 96 121 H   Calcium 8.5 L 9.2 9.2   Albumin 3.7 4.0 3.8   Total Protein 7.3 8.0 7.7   Sodium 140 141 143   Potassium 3.8 3.7 3.7   CO2 22 L 24 24   Chloride 107 105 107   BUN 11 13 10   Creatinine 0.7 0.6 0.7   Alkaline Phosphatase 81 95 81   ALT 42 29 27   AST 25 24 23   Total Bilirubin 0.3 0.5 0.4     LIPIDS:  Recent Labs    Lab 08/03/20  0919 11/03/21  0945   TSH 3.520 3.452   HDL 59 53   Cholesterol 245 H 203 H   Triglycerides 249 H 178 H   LDL Cholesterol 136.2 114.4   HDL/Cholesterol Ratio 24.1 26.1   Non-HDL Cholesterol 186 150   Total Cholesterol/HDL Ratio 4.2 3.8     TSH:  Recent Labs   Lab 08/03/20  0919 11/03/21  0945   TSH 3.520 3.452        Objective:      Vitals:    03/21/23 0846   BP: 118/80   Pulse: 72   Temp: 98.1 °F (36.7 °C)      Physical Exam  Vitals reviewed.   Constitutional:       Appearance: Normal appearance. She is normal weight.   HENT:      Head: Normocephalic and atraumatic.      Right Ear: A middle ear effusion is present.      Left Ear: A middle ear effusion is present.   Eyes:      Conjunctiva/sclera: Conjunctivae normal.   Cardiovascular:      Rate and Rhythm: Normal rate and regular rhythm.      Heart sounds: Normal heart sounds.   Pulmonary:      Effort: Pulmonary effort is normal.      Breath sounds: Normal breath sounds.   Abdominal:      Palpations: Abdomen is soft.      Tenderness: There is no abdominal tenderness.   Musculoskeletal:         General: Normal range of motion.      Cervical back: Normal range of motion.      Right lower leg: No edema.      Left lower leg: No edema.   Neurological:      General: No focal deficit present.      Mental Status: She is alert and oriented to person, place, and time.   Psychiatric:         Mood and Affect: Mood normal.         Behavior: Behavior normal.        Assessment:       1. Encounter to establish care with new doctor    2. Wellness examination    3. Dysfunction of both eustachian tubes    4. Vertigo    5. History of iron deficiency    6. H/O thyroid nodule    7. Elevated fasting glucose    8. Encounter for screening for cardiovascular disorders    9. Screening for malignant neoplasm of colon          Plan:   1. Encounter to establish care with new doctor    2. Wellness examination  - CBC Without Differential; Standing  - Comprehensive Metabolic Panel;  Standing  - Hemoglobin A1C; Standing  - Lipid Panel; Standing  - TSH; Standing    3. Dysfunction of both eustachian tubes    4. Vertigo  - meclizine (ANTIVERT) 25 mg tablet; Take 1 tablet (25 mg total) by mouth 3 (three) times daily as needed for Dizziness.  Dispense: 30 tablet; Refill: 1    5. History of iron deficiency  - IRON AND TIBC; Standing  - FERRITIN; Standing    6. H/O thyroid nodule  - TSH; Standing  - T4, Free; Standing  - T3; Standing    7. Elevated fasting glucose  - Hemoglobin A1C; Standing    8. Encounter for screening for cardiovascular disorders  - CBC Without Differential; Standing  - Comprehensive Metabolic Panel; Standing  - Hemoglobin A1C; Standing  - Lipid Panel; Standing  - TSH; Standing    9. Screening for malignant neoplasm of colon  - Cologuard Screening (Multitarget Stool DNA); Future  - Cologuard Screening (Multitarget Stool DNA)       Establish care and Well female  Labs per orders   HM discussed colonoscopy ordered   Continue healthy lifestyle effort  ETD start flonase and zyrtec daily; if continues or worsens we can try short steroid course to help dry up   Vertigo: exercises provided; meclizine PRN; ETD remedies may help as well  Continue current meds as prescribed otherwise; refills per request  Keep routine specialist f/u   RTC in 1 year with labs prior and/or PRN          Breanna Keen   [facility]   3/21/23     > 40 min spend with pt face to face and on chart review

## 2023-04-11 ENCOUNTER — PATIENT MESSAGE (OUTPATIENT)
Dept: ADMINISTRATIVE | Facility: HOSPITAL | Age: 53
End: 2023-04-11
Payer: COMMERCIAL

## 2023-04-14 LAB — NONINV COLON CA DNA+OCC BLD SCRN STL QL: NEGATIVE

## 2024-02-15 ENCOUNTER — HOSPITAL ENCOUNTER (EMERGENCY)
Facility: HOSPITAL | Age: 54
Discharge: HOME | End: 2024-02-15
Attending: EMERGENCY MEDICINE
Payer: COMMERCIAL

## 2024-02-15 VITALS
HEIGHT: 64 IN | BODY MASS INDEX: 28 KG/M2 | WEIGHT: 164 LBS | RESPIRATION RATE: 18 BRPM | SYSTOLIC BLOOD PRESSURE: 144 MMHG | TEMPERATURE: 98 F | DIASTOLIC BLOOD PRESSURE: 90 MMHG | HEART RATE: 72 BPM | OXYGEN SATURATION: 100 %

## 2024-02-15 DIAGNOSIS — E07.89 THYROID MASS OF UNCLEAR ETIOLOGY: Primary | ICD-10-CM

## 2024-02-15 LAB
ANION GAP SERPL CALC-SCNC: 10 MMOL/L (ref 8–16)
BASOPHILS # BLD AUTO: 0.03 K/UL (ref 0–0.2)
BASOPHILS NFR BLD: 0.4 % (ref 0–1.9)
BUN SERPL-MCNC: 12 MG/DL (ref 6–20)
CALCIUM SERPL-MCNC: 9.4 MG/DL (ref 8.7–10.5)
CHLORIDE SERPL-SCNC: 105 MMOL/L (ref 95–110)
CO2 SERPL-SCNC: 26 MMOL/L (ref 23–29)
CREAT SERPL-MCNC: 0.7 MG/DL (ref 0.5–1.4)
DIFFERENTIAL METHOD BLD: ABNORMAL
EOSINOPHIL # BLD AUTO: 0.1 K/UL (ref 0–0.5)
EOSINOPHIL NFR BLD: 1.3 % (ref 0–8)
ERYTHROCYTE [DISTWIDTH] IN BLOOD BY AUTOMATED COUNT: 14.4 % (ref 11.5–14.5)
EST. GFR  (NO RACE VARIABLE): >60 ML/MIN/1.73 M^2
GLUCOSE SERPL-MCNC: 147 MG/DL (ref 70–110)
HCT VFR BLD AUTO: 29.2 % (ref 37–48.5)
HGB BLD-MCNC: 9.6 G/DL (ref 12–16)
IMM GRANULOCYTES # BLD AUTO: 0.04 K/UL (ref 0–0.04)
IMM GRANULOCYTES NFR BLD AUTO: 0.5 % (ref 0–0.5)
LYMPHOCYTES # BLD AUTO: 2 K/UL (ref 1–4.8)
LYMPHOCYTES NFR BLD: 26.5 % (ref 18–48)
MCH RBC QN AUTO: 29.5 PG (ref 27–31)
MCHC RBC AUTO-ENTMCNC: 32.9 G/DL (ref 32–36)
MCV RBC AUTO: 90 FL (ref 82–98)
MONOCYTES # BLD AUTO: 0.5 K/UL (ref 0.3–1)
MONOCYTES NFR BLD: 6.1 % (ref 4–15)
NEUTROPHILS # BLD AUTO: 4.9 K/UL (ref 1.8–7.7)
NEUTROPHILS NFR BLD: 65.2 % (ref 38–73)
NRBC BLD-RTO: 0 /100 WBC
PLATELET # BLD AUTO: 448 K/UL (ref 150–450)
PMV BLD AUTO: 9.1 FL (ref 9.2–12.9)
POTASSIUM SERPL-SCNC: 3.7 MMOL/L (ref 3.5–5.1)
RBC # BLD AUTO: 3.25 M/UL (ref 4–5.4)
SODIUM SERPL-SCNC: 141 MMOL/L (ref 136–145)
TSH SERPL DL<=0.005 MIU/L-ACNC: 2.42 UIU/ML (ref 0.4–4)
WBC # BLD AUTO: 7.54 K/UL (ref 3.9–12.7)

## 2024-02-15 PROCEDURE — 80048 BASIC METABOLIC PNL TOTAL CA: CPT

## 2024-02-15 PROCEDURE — 84443 ASSAY THYROID STIM HORMONE: CPT

## 2024-02-15 PROCEDURE — 99284 EMERGENCY DEPT VISIT MOD MDM: CPT | Mod: 25

## 2024-02-15 PROCEDURE — 85025 COMPLETE CBC W/AUTO DIFF WBC: CPT

## 2024-02-15 NOTE — ED TRIAGE NOTES
Pt presents to eD today with son who reports pt c/o swelling to neck x 3 days   Reports issues with thyroid was being tx in [place]  Denies N/V or fever   Pt in no acute distress

## 2024-02-16 NOTE — ED PROVIDER NOTES
"This is an assumption of care note.     Upon shift change, the patient was transferred to me from Christi Christensen MD at 8:00 PM in stable condition.     Madelin Hidalgo is a 53 y.o. female who  has a past medical history of Ectopic pregnancy, History of ectopic pregnancy (6/25/2018), and Iron deficiency.  Patient presented to ED with chief complaint of   Chief Complaint   Patient presents with    Neck Swelling     Swelling to the anterior neck X3 days. History of thyroid surgery while living in [place].    .  Workup currently in progress.   Patient transitioned to my care pending Imaging and Ultimate Disposition    Update:   Patient remained stable. Thyroid mass on CT without abnormal function on lab testing. No difficulty breathing or swallowing. Seems safe for dc and outpatient f/u. ENT referral placed.      VITALS:  Vitals:    02/15/24 1602   BP: 139/84   Pulse: 98   Resp: 18   Temp: 98.2 °F (36.8 °C)   SpO2: 99%   Weight: 74.4 kg (164 lb)   Height: 5' 4" (1.626 m)         IMAGING:  Imaging Results              CT Soft Tissue Neck WO Contrast (Final result)  Result time 02/15/24 20:33:07      Final result by Tonio Christensen DO (02/15/24 20:33:07)                   Impression:      Mass or hyperdense cyst in the left thyroid lobe as above.  Recommend further evaluation with thyroid ultrasound.      Electronically signed by: Tonio Christensen  Date:    02/15/2024  Time:    20:33               Narrative:    EXAMINATION:  CT SOFT TISSUE NECK WITHOUT CONTRAST    CLINICAL HISTORY:  Neck mass, nonpulsatile;    TECHNIQUE:  Low dose axial images, sagittal and coronal reformations of the neck were performed from the skull base to the level of the clavicles, without intravenous contrast.    COMPARISON:  None    FINDINGS:  There is a large left thyroid lobe mass or hyperdense cyst measuring 3.0 x 2.6 x 2.5 cm (cc by AP by TR), with slight mass effect on the trachea which demonstrates mild rightward deviation and is otherwise " patent.  No additional masses are seen in the neck soft tissues.  There is no evidence of significant cervical lymphadenopathy.  The oropharynx is clear.  The epiglottis is normal.  The parapharyngeal and retropharyngeal spaces are unremarkable.  The palatine tonsils are normal.  The trachea is patent and unremarkable.  The bilateral lung apices are clear.  Osseous structures are intact.  No aggressive osseous lesion seen.  There is mild occult thickening of the floors of the bilateral maxillary sinuses.  Remaining paranasal sinuses and mastoid air cells are clear.  Partially imaged portions of the brain parenchyma are unremarkable.                                        LABS:  Labs Reviewed   CBC W/ AUTO DIFFERENTIAL - Abnormal; Notable for the following components:       Result Value    RBC 3.25 (*)     Hemoglobin 9.6 (*)     Hematocrit 29.2 (*)     MPV 9.1 (*)     All other components within normal limits   BASIC METABOLIC PANEL - Abnormal; Notable for the following components:    Glucose 147 (*)     All other components within normal limits   TSH         MEDICATIONS:  Medications - No data to display      IMPRESSION:  1. Thyroid mass of unclear etiology       Orders Placed This Encounter   Procedures    Ambulatory referral/consult to ENT     Standing Status:   Future     Standing Expiration Date:   3/15/2025     Referral Priority:   Urgent     Referral Type:   Consultation     Referral Reason:   Specialty Services Required     Requested Specialty:   Otolaryngology     Number of Visits Requested:   1         DISCHARGE MEDICATIONS:  New Prescriptions    No medications on file          DISPOSITION:      ED Disposition Condition    Discharge Stable              Kenyon Dela Cruz MD  02/17/24 5777

## 2024-02-16 NOTE — ED NOTES
Patient identifiers for Madelin Hidalgo checked and correct.  LOC: The patient is awake, alert and aware of environment with an appropriate affect, the patient is oriented x 3 and speaking appropriately.  APPEARANCE: Patient resting comfortably and in no acute distress, patient is clean and well groomed, patient's clothing are properly fastened.  SKIN: The skin is warm and dry, patient has normal skin turgor and moist mucus membranes, Swelling to front left of neck.  MUSKULOSKELETAL: Patient moving all extremities well.

## 2024-02-16 NOTE — ED PROVIDER NOTES
Encounter Date: 2/15/2024       History     Chief Complaint   Patient presents with    Neck Swelling     Swelling to the anterior neck X3 days. History of thyroid surgery while living in [place].      The patient is a 53-year-old female who came to the emergency department with a mass on her left neck for the past 3 days.  She denies any pain or redness to the area.  The patient states she had thyroid surgery in [place] 12 years ago.  She states they told her her thyroid was enlarged and that is why they took out a portion of her thyroid.  She has never been on thyroid medications.  She states her thyroid functions are followed and they are always within normal limits.      Review of patient's allergies indicates:  No Known Allergies  Past Medical History:   Diagnosis Date    Ectopic pregnancy     History of ectopic pregnancy 6/25/2018    Iron deficiency      Past Surgical History:   Procedure Laterality Date    THYROID SURGERY      unclear what kind, she is unsure     Family History   Problem Relation Age of Onset    Hypertension Mother     No Known Problems Father     Esophageal cancer Paternal Grandfather     No Known Problems Sister     No Known Problems Brother     No Known Problems Brother     No Known Problems Sister     Breast cancer Neg Hx     Colon cancer Neg Hx     Ovarian cancer Neg Hx      Social History     Tobacco Use    Smoking status: Never    Smokeless tobacco: Never   Substance Use Topics    Alcohol use: No    Drug use: No     Review of Systems   Constitutional:  Negative for fever.   HENT:  Negative for sore throat.    Respiratory:  Negative for shortness of breath.    Cardiovascular:  Negative for chest pain.   Gastrointestinal:  Negative for nausea.   Genitourinary:  Negative for dysuria.   Musculoskeletal:  Negative for back pain.   Skin:  Negative for rash.   Neurological:  Negative for weakness.   Hematological:  Does not bruise/bleed easily.       Physical Exam     Initial Vitals [02/15/24  1602]   BP Pulse Resp Temp SpO2   139/84 98 18 98.2 °F (36.8 °C) 99 %      MAP       --         Physical Exam    Nursing note and vitals reviewed.  Constitutional: She appears well-developed and well-nourished.   HENT:   Head: Normocephalic and atraumatic.   Mouth/Throat: Oropharynx is clear and moist.   Eyes: Conjunctivae and EOM are normal. Pupils are equal, round, and reactive to light.   Neck: Neck supple. Thyromegaly (There is a mass to the left of the midline on her anterior neck.  The mass is nonfluctuant and nontender.) present.   Normal range of motion.  Cardiovascular:  Normal rate, regular rhythm, normal heart sounds and intact distal pulses.     Exam reveals no gallop and no friction rub.       No murmur heard.  Pulmonary/Chest: Breath sounds normal.   Abdominal: Abdomen is soft. Bowel sounds are normal. She exhibits no distension. There is no abdominal tenderness. There is no rebound and no guarding.   Musculoskeletal:         General: No tenderness or edema. Normal range of motion.      Cervical back: Normal range of motion and neck supple.     Lymphadenopathy:     She has no cervical adenopathy.   Neurological: She is alert and oriented to person, place, and time. She has normal strength and normal reflexes.   Skin: Skin is warm and dry.   Psychiatric: She has a normal mood and affect. Her behavior is normal. Judgment and thought content normal.         ED Course   Procedures  Labs Reviewed   CBC W/ AUTO DIFFERENTIAL - Abnormal; Notable for the following components:       Result Value    RBC 3.25 (*)     Hemoglobin 9.6 (*)     Hematocrit 29.2 (*)     MPV 9.1 (*)     All other components within normal limits   BASIC METABOLIC PANEL - Abnormal; Notable for the following components:    Glucose 147 (*)     All other components within normal limits   TSH          Imaging Results              CT Soft Tissue Neck WO Contrast (Final result)  Result time 02/15/24 20:33:07      Final result by Tonio Christensen  DO JIMENEZ (02/15/24 20:33:07)                   Impression:      Mass or hyperdense cyst in the left thyroid lobe as above.  Recommend further evaluation with thyroid ultrasound.      Electronically signed by: Tonio Christensen  Date:    02/15/2024  Time:    20:33               Narrative:    EXAMINATION:  CT SOFT TISSUE NECK WITHOUT CONTRAST    CLINICAL HISTORY:  Neck mass, nonpulsatile;    TECHNIQUE:  Low dose axial images, sagittal and coronal reformations of the neck were performed from the skull base to the level of the clavicles, without intravenous contrast.    COMPARISON:  None    FINDINGS:  There is a large left thyroid lobe mass or hyperdense cyst measuring 3.0 x 2.6 x 2.5 cm (cc by AP by TR), with slight mass effect on the trachea which demonstrates mild rightward deviation and is otherwise patent.  No additional masses are seen in the neck soft tissues.  There is no evidence of significant cervical lymphadenopathy.  The oropharynx is clear.  The epiglottis is normal.  The parapharyngeal and retropharyngeal spaces are unremarkable.  The palatine tonsils are normal.  The trachea is patent and unremarkable.  The bilateral lung apices are clear.  Osseous structures are intact.  No aggressive osseous lesion seen.  There is mild occult thickening of the floors of the bilateral maxillary sinuses.  Remaining paranasal sinuses and mastoid air cells are clear.  Partially imaged portions of the brain parenchyma are unremarkable.                                       Medications - No data to display  Medical Decision Making  DDX:  Goiter, thyroid nodule, thyroid cancer, branchial cleft cyst      MDM:  The patient is a 53-year-old female with a mass to her left thyroid gland.  Labs are within normal limits.  CT scan of the soft tissue is ordered and pending. The patient will be signed out to oncoming physician to check results and disposition.                                      Clinical Impression:  Final  diagnoses:  [E07.89] Thyroid mass of unclear etiology (Primary)          ED Disposition Condition    Discharge Stable          ED Prescriptions    None       Follow-up Information       Follow up With Specialties Details Why Contact Info    [facility] ENT Otolaryngology Schedule an appointment as soon as possible for a visit in 3 days  [address] [address]  [phone]    [facility] Emergency Dept Emergency Medicine Go to  As needed, If symptoms worsen [address] [id number] [phone]             Christi Christensen MD  02/22/24 0618

## 2024-02-16 NOTE — DISCHARGE INSTRUCTIONS
A referral has been placed for your follow-up. Return to the ED for any new difficulty breathing or if you become unable to swallow.

## 2024-02-21 ENCOUNTER — PATIENT MESSAGE (OUTPATIENT)
Dept: OTOLARYNGOLOGY | Facility: CLINIC | Age: 54
End: 2024-02-21

## 2024-02-21 ENCOUNTER — OFFICE VISIT (OUTPATIENT)
Dept: OTOLARYNGOLOGY | Facility: CLINIC | Age: 54
End: 2024-02-21
Payer: COMMERCIAL

## 2024-02-21 ENCOUNTER — HOSPITAL ENCOUNTER (OUTPATIENT)
Dept: RADIOLOGY | Facility: HOSPITAL | Age: 54
Discharge: HOME | End: 2024-02-21
Attending: OTOLARYNGOLOGY
Payer: COMMERCIAL

## 2024-02-21 VITALS
WEIGHT: 165 LBS | SYSTOLIC BLOOD PRESSURE: 127 MMHG | DIASTOLIC BLOOD PRESSURE: 84 MMHG | BODY MASS INDEX: 28.32 KG/M2 | HEART RATE: 75 BPM

## 2024-02-21 DIAGNOSIS — E07.89 THYROID MASS OF UNCLEAR ETIOLOGY: Primary | ICD-10-CM

## 2024-02-21 DIAGNOSIS — E07.89 THYROID MASS OF UNCLEAR ETIOLOGY: Chronic | ICD-10-CM

## 2024-02-21 DIAGNOSIS — E07.89 THYROID MASS OF UNCLEAR ETIOLOGY: Primary | Chronic | ICD-10-CM

## 2024-02-21 DIAGNOSIS — Z86.39 HISTORY OF IRON DEFICIENCY: Chronic | ICD-10-CM

## 2024-02-21 PROCEDURE — 3079F DIAST BP 80-89 MM HG: CPT | Mod: CPTII,S$GLB,, | Performed by: OTOLARYNGOLOGY

## 2024-02-21 PROCEDURE — 76536 US EXAM OF HEAD AND NECK: CPT | Mod: TC

## 2024-02-21 PROCEDURE — 99999 PR PBB SHADOW E&M-EST. PATIENT-LVL III: CPT | Mod: PBBFAC,,, | Performed by: OTOLARYNGOLOGY

## 2024-02-21 PROCEDURE — 1159F MED LIST DOCD IN RCRD: CPT | Mod: CPTII,S$GLB,, | Performed by: OTOLARYNGOLOGY

## 2024-02-21 PROCEDURE — 3074F SYST BP LT 130 MM HG: CPT | Mod: CPTII,S$GLB,, | Performed by: OTOLARYNGOLOGY

## 2024-02-21 PROCEDURE — 3008F BODY MASS INDEX DOCD: CPT | Mod: CPTII,S$GLB,, | Performed by: OTOLARYNGOLOGY

## 2024-02-21 PROCEDURE — 76536 US EXAM OF HEAD AND NECK: CPT | Mod: 26,,, | Performed by: RADIOLOGY

## 2024-02-21 PROCEDURE — 99204 OFFICE O/P NEW MOD 45 MIN: CPT | Mod: S$GLB,,, | Performed by: OTOLARYNGOLOGY

## 2024-02-21 PROCEDURE — 1160F RVW MEDS BY RX/DR IN RCRD: CPT | Mod: CPTII,S$GLB,, | Performed by: OTOLARYNGOLOGY

## 2024-02-21 NOTE — PROGRESS NOTES
Chief Complaint   Patient presents with    Thyroid Problem   Note: patient seen with professional mandarin  via videoconferencing Alex #[id number]      HPI: Madelin Hidalgo is a 53 y.o. female who was referred to me by Dr. Kenyon Dela Cruz in consultation for a thyroid mass. Presents for evaluation of lump in left neck which has been present for 10 years. She states over the last week she had increased pain in the left neck. She relays that the bump in her neck has been getting larger in the last month. She relays that she had URI and cold with increased coughing but it seems to be worse since this time.  She also relays that she had surgery on the thyroid 2012 in [place]. She recalls this was done for a cyst in the thyroid; she denies history of cancer. She has no had any recent thyroid ultrasound. Denies hyper/hypothyroid symptoms. Denies hoarseness, dysphagia, odynophagia, or shortness of breath. Has no history of personal radiation exposure. Denies family history of thyroid cancer.         Past Medical History:   Diagnosis Date    Ectopic pregnancy     History of ectopic pregnancy 6/25/2018    Iron deficiency      Social History     Socioeconomic History    Marital status:    Tobacco Use    Smoking status: Never    Smokeless tobacco: Never   Substance and Sexual Activity    Alcohol use: No    Drug use: No    Sexual activity: Yes     Partners: Male     Birth control/protection: None   Social History Narrative    11/3/2021: She lives with her  and two children. Her  is also a patient of mine. There are no pets at home. 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      Past Surgical History:   Procedure Laterality Date    THYROID SURGERY      unclear what kind, she is unsure     Family History   Problem Relation Age of Onset    Hypertension Mother     No Known Problems Father     Esophageal cancer Paternal Grandfather     No Known Problems Sister     No  Known Problems Brother     No Known Problems Brother     No Known Problems Sister     Breast cancer Neg Hx     Colon cancer Neg Hx     Ovarian cancer Neg Hx            Review of Systems  General: negative for chills, fever or weight loss  Psychological: negative for mood changes or depression  Ophthalmic: negative for blurry vision, photophobia or eye pain  ENT: see HPI  Respiratory: no cough, shortness of breath, or wheezing  Cardiovascular: no chest pain or dyspnea on exertion  Gastrointestinal: no abdominal pain, change in bowel habits, or black/ bloody stools  Musculoskeletal: negative for gait disturbance or muscular weakness  Neurological: no syncope or seizures; no ataxia  Dermatological: negative for puritis,  rash and jaundice  Hematologic/lymphatic: no easy bruising, no new lumps or bumps      Physical Exam:    Vitals:    02/21/24 0837   BP: 127/84   Pulse: 75       Constitutional: Well appearing / communicating without difficutly.  NAD.  Eyes: EOM I Bilaterally  Head/Face: Normocephalic.  Negative paranasal sinus pressure/tenderness.  Salivary glands WNL.  House Brackmann I Bilaterally.    Right Ear: Auricle normal appearance. External Auditory Canal within normal limits no lesions or masses,TM w/o masses/lesions/perforations. TM mobility noted.   Left Ear: Auricle normal appearance. External Auditory Canal within normal limits no lesions or masses,TM w/o masses/lesions/perforations. TM mobility noted.  Nose: No gross nasal septal deviation. Inferior Turbinates 3+ bilaterally. No septal perforation. No masses/lesions. External nasal skin appears normal without masses/lesions.  Oral Cavity: Gingiva/lips within normal limits.  Dentition/gingiva healthy appearing. Mucus membranes moist. Floor of mouth soft, no masses palpated. Oral Tongue mobile. Hard Palate appears normal.    Oropharynx: Base of tongue appears normal. No masses/lesions noted. Tonsillar fossa/pharyngeal wall without lesions. Posterior  oropharynx WNL.  Soft palate without masses. Midline uvula.   Neck/Lymphatic: No LAD I-VI bilaterally.  + left thyroid enlargement 3x3cm in size.   No masses noted on exam.      Diagnostic studies:   Lab Results   Component Value Date    TSH 2.421 02/15/2024       CT soft tissue neck 2/15/2024:     FINDINGS:  There is a large left thyroid lobe mass or hyperdense cyst measuring 3.0 x 2.6 x 2.5 cm (cc by AP by TR), with slight mass effect on the trachea which demonstrates mild rightward deviation and is otherwise patent.  No additional masses are seen in the neck soft tissues.  There is no evidence of significant cervical lymphadenopathy.  The oropharynx is clear.  The epiglottis is normal.  The parapharyngeal and retropharyngeal spaces are unremarkable.  The palatine tonsils are normal.  The trachea is patent and unremarkable.  The bilateral lung apices are clear.  Osseous structures are intact.  No aggressive osseous lesion seen.  There is mild occult thickening of the floors of the bilateral maxillary sinuses.  Remaining paranasal sinuses and mastoid air cells are clear.  Partially imaged portions of the brain parenchyma are unremarkable.     Impression:     Mass or hyperdense cyst in the left thyroid lobe as above.  Recommend further evaluation with thyroid ultrasound.    Assessment:    ICD-10-CM ICD-9-CM    1. Thyroid mass of unclear etiology  E07.89 239.7 Ambulatory referral/consult to ENT      US Thyroid      2. History of iron deficiency  Z86.39 V12.3         The primary encounter diagnosis was Thyroid mass of unclear etiology. A diagnosis of History of iron deficiency was also pertinent to this visit.      Plan:  Orders Placed This Encounter   Procedures    US Thyroid       We discussed how common thyroid nodules are in the general population. However, I explained that the vast majority of thyroid nodules are not malignant and that there are certain steps that we have to take to evaluate thyroid  nodules/lesions for malignancy. She has had CT scan showing abnormality but will need thyroid ultrasound to further characterize. We discussed that based on current imaging it is likely she will need FNA biopsy as well. Orders placed for thyroid ultrasound.     Follow up with Dr. Keen to evaluate decreased h/h      Natasha Rebollar MD

## 2024-02-21 NOTE — TELEPHONE ENCOUNTER
Please notify patient that the ultrasound completed today shows complex nodule that does meed criteria for FNA. The orders have been placed for this test. Please assist pt in scheduling.

## 2024-02-23 ENCOUNTER — TELEPHONE (OUTPATIENT)
Dept: INTERVENTIONAL RADIOLOGY/VASCULAR | Facility: CLINIC | Age: 54
End: 2024-02-23
Payer: COMMERCIAL

## 2024-02-23 NOTE — TELEPHONE ENCOUNTER
Spoke to pt on phone, Pt is scheduled on 3/5/2024 for IR procedure at [facility]. Pt aware and confirmed, Thanks

## 2024-02-26 ENCOUNTER — OFFICE VISIT (OUTPATIENT)
Dept: FAMILY MEDICINE | Facility: CLINIC | Age: 54
End: 2024-02-26
Payer: COMMERCIAL

## 2024-02-26 VITALS
HEIGHT: 64 IN | DIASTOLIC BLOOD PRESSURE: 86 MMHG | HEART RATE: 85 BPM | TEMPERATURE: 98 F | WEIGHT: 149.5 LBS | OXYGEN SATURATION: 99 % | SYSTOLIC BLOOD PRESSURE: 124 MMHG | BODY MASS INDEX: 25.52 KG/M2

## 2024-02-26 DIAGNOSIS — E55.9 VITAMIN D DEFICIENCY: ICD-10-CM

## 2024-02-26 DIAGNOSIS — N95.1 PERIMENOPAUSAL: ICD-10-CM

## 2024-02-26 DIAGNOSIS — Z12.31 ENCOUNTER FOR SCREENING MAMMOGRAM FOR MALIGNANT NEOPLASM OF BREAST: ICD-10-CM

## 2024-02-26 DIAGNOSIS — Z86.39 H/O THYROID NODULE: ICD-10-CM

## 2024-02-26 DIAGNOSIS — Z00.00 WELLNESS EXAMINATION: Primary | ICD-10-CM

## 2024-02-26 DIAGNOSIS — Z86.39 HISTORY OF IRON DEFICIENCY: ICD-10-CM

## 2024-02-26 DIAGNOSIS — M62.838 CERVICAL PARASPINAL MUSCLE SPASM: ICD-10-CM

## 2024-02-26 PROBLEM — R42 VERTIGO: Status: RESOLVED | Noted: 2023-03-21 | Resolved: 2024-02-26

## 2024-02-26 PROBLEM — H69.93 DYSFUNCTION OF BOTH EUSTACHIAN TUBES: Status: RESOLVED | Noted: 2023-03-21 | Resolved: 2024-02-26

## 2024-02-26 PROCEDURE — 99396 PREV VISIT EST AGE 40-64: CPT | Mod: S$GLB,,, | Performed by: STUDENT IN AN ORGANIZED HEALTH CARE EDUCATION/TRAINING PROGRAM

## 2024-02-26 PROCEDURE — 99999 PR PBB SHADOW E&M-EST. PATIENT-LVL V: CPT | Mod: PBBFAC,,, | Performed by: STUDENT IN AN ORGANIZED HEALTH CARE EDUCATION/TRAINING PROGRAM

## 2024-02-26 PROCEDURE — 3074F SYST BP LT 130 MM HG: CPT | Mod: CPTII,S$GLB,, | Performed by: STUDENT IN AN ORGANIZED HEALTH CARE EDUCATION/TRAINING PROGRAM

## 2024-02-26 PROCEDURE — 3079F DIAST BP 80-89 MM HG: CPT | Mod: CPTII,S$GLB,, | Performed by: STUDENT IN AN ORGANIZED HEALTH CARE EDUCATION/TRAINING PROGRAM

## 2024-02-26 PROCEDURE — 1160F RVW MEDS BY RX/DR IN RCRD: CPT | Mod: CPTII,S$GLB,, | Performed by: STUDENT IN AN ORGANIZED HEALTH CARE EDUCATION/TRAINING PROGRAM

## 2024-02-26 PROCEDURE — 3008F BODY MASS INDEX DOCD: CPT | Mod: CPTII,S$GLB,, | Performed by: STUDENT IN AN ORGANIZED HEALTH CARE EDUCATION/TRAINING PROGRAM

## 2024-02-26 PROCEDURE — 1159F MED LIST DOCD IN RCRD: CPT | Mod: CPTII,S$GLB,, | Performed by: STUDENT IN AN ORGANIZED HEALTH CARE EDUCATION/TRAINING PROGRAM

## 2024-02-26 RX ORDER — TIZANIDINE 4 MG/1
4 TABLET ORAL NIGHTLY PRN
Qty: 60 TABLET | Refills: 1 | Status: SHIPPED | OUTPATIENT
Start: 2024-02-26 | End: 2024-05-23

## 2024-02-26 NOTE — PROGRESS NOTES
Subjective:       Patient ID: Madelin Hidalgo is a 53 y.o. female.    Chief Complaint: Follow-up      Active Problem List with Overview Notes    Diagnosis Date Noted    Elevated fasting glucose 11/04/2021     Due for labs  Diet controlled      Vitamin D deficiency 11/02/2021     Supplements advised       History of iron deficiency 10/11/2019     No longer taking oral iron   2019 iron infusion with [facility]  Has had few cycles though not monthly   CBC drop noted on ER labs  Likely low iron again, discussed options for infusion she is not opposed to this   + fatigue, dizzy, SOB, very tired not feeling like self   Also notes has been getting sick a lot lately, more than normal, anytime anyone she is around is sick she feels she also gets           H/O thyroid nodule 06/25/2018     S/p resection in [place], nodule? Still has thyroid  Went to ER for enlarged thyroid cyst few weeks ago  Seen by ENT now and scheduled for IR FNA  Was painful and happened when sick with cough   She is feeling better now           C/o neck pain and stiffness past few days, open to stretches, heat, ibuprofen, will try m relaxer as well   Also thinks not sleeping well as feels tired all the time though suspect has more to do with iron low  Interested in talking with GYN RE HRT     Review of Systems   Constitutional:  Positive for fatigue.   Respiratory:  Positive for shortness of breath.    Genitourinary:  Positive for menstrual problem and vaginal bleeding.   Musculoskeletal:  Positive for neck pain and neck stiffness.   Neurological:  Positive for dizziness and weakness.   All other systems reviewed and are negative.       A1C:  Recent Labs   Lab 11/03/21  0945 03/21/23  0943   Hemoglobin A1C 5.9 H 5.8 H     CBC:  Recent Labs   Lab 03/05/23  0240 03/21/23  0943 02/15/24  1619   WBC 7.61 9.47 7.54   RBC 4.22 4.19 3.25 L   Hemoglobin 12.2 12.0 9.6 L   Hematocrit 37.3 37.6 29.2 L   Platelets 293 283 448   MCV 88 90 90   MCH 28.9 28.6 29.5   MCHC 32.7  31.9 L 32.9     CMP:  Recent Labs   Lab 11/03/21  0945 03/05/23  0240 03/21/23  0943 02/15/24  1619   Glucose 96 121 H 104 147 H   Calcium 9.2 9.2 8.5 L 9.4   Albumin 4.0 3.8 4.3  --    Total Protein 8.0 7.7 7.6  --    Sodium 141 143 140 141   Potassium 3.7 3.7 3.9 3.7   CO2 24 24 29 26   Chloride 105 107 106 105   BUN 13 10 13 12   Creatinine 0.6 0.7 0.45 L 0.7   Alkaline Phosphatase 95 81 81  --    ALT 29 27 23  --    AST 24 23 25  --    Total Bilirubin 0.5 0.4 0.4  --      LIPIDS:  Recent Labs   Lab 11/03/21  0945 03/21/23  0943 02/15/24  1619   TSH 3.452 2.270 2.421   HDL 53 49  --    Cholesterol 203 H 194  --    Triglycerides 178 H 249 H  --    LDL Cholesterol 114.4 95.2  --    HDL/Cholesterol Ratio 26.1 25.3  --    Non-HDL Cholesterol 150 145  --    Total Cholesterol/HDL Ratio 3.8 4.0  --      TSH:  Recent Labs   Lab 11/03/21  0945 03/21/23  0943 02/15/24  1619   TSH 3.452 2.270 2.421        Objective:      Vitals:    02/26/24 1306   BP: 124/86   Pulse: 85   Temp: 97.7 °F (36.5 °C)      Physical Exam  Vitals reviewed.   Constitutional:       Appearance: Normal appearance. She is normal weight.   HENT:      Head: Normocephalic and atraumatic.   Eyes:      Conjunctiva/sclera: Conjunctivae normal.   Cardiovascular:      Rate and Rhythm: Normal rate and regular rhythm.      Heart sounds: Normal heart sounds.   Pulmonary:      Effort: Pulmonary effort is normal.      Breath sounds: Normal breath sounds.   Abdominal:      Palpations: Abdomen is soft.      Tenderness: There is no abdominal tenderness.   Musculoskeletal:         General: Normal range of motion.      Cervical back: Normal range of motion.      Right lower leg: No edema.      Left lower leg: No edema.   Neurological:      Mental Status: She is alert. Mental status is at baseline.   Psychiatric:         Mood and Affect: Mood normal.         Behavior: Behavior normal.          Assessment:       1. Wellness examination    2. Perimenopausal    3. History of  iron deficiency    4. H/O thyroid nodule    5. Cervical paraspinal muscle spasm    6. Vitamin D deficiency    7. Encounter for screening mammogram for malignant neoplasm of breast        Plan:   1. Wellness examination    2. Perimenopausal  - Ambulatory referral/consult to Obstetrics / Gynecology; Future    3. History of iron deficiency    4. H/O thyroid nodule    5. Cervical paraspinal muscle spasm  - tiZANidine (ZANAFLEX) 4 MG tablet; Take 1 tablet (4 mg total) by mouth nightly as needed (muscle spams).  Dispense: 60 tablet; Refill: 1    6. Vitamin D deficiency    7. Encounter for screening mammogram for malignant neoplasm of breast  - Mammo Digital Screening Bilat w/ Oleg; Future     Well female  Labs per orders   HM discussed  mammogram ordered  Refer to GYN for HRT options   Plan for iron infusion based on results of iron levels though suspect will be low   Start Zanaflex QHS for neck pain, ibuprofen in AM PRN, stretches, heat/ice PRN  Continue healthy lifestyle efforts  Continue current meds as prescribed otherwise; refills per request  Keep routine specialist f/u   RTC in 6 months  with labs prior and/or PRN          Breanna PalumboWhite [facility]   2/26/24

## 2024-03-04 ENCOUNTER — TELEPHONE (OUTPATIENT)
Dept: FAMILY MEDICINE | Facility: CLINIC | Age: 54
End: 2024-03-04
Payer: COMMERCIAL

## 2024-03-04 ENCOUNTER — TELEPHONE (OUTPATIENT)
Dept: INTERVENTIONAL RADIOLOGY/VASCULAR | Facility: HOSPITAL | Age: 54
End: 2024-03-04
Payer: COMMERCIAL

## 2024-03-04 DIAGNOSIS — D50.0 IRON DEFICIENCY ANEMIA DUE TO CHRONIC BLOOD LOSS: Primary | ICD-10-CM

## 2024-03-04 NOTE — TELEPHONE ENCOUNTER
Daughter states you where suppose to place order for iron infusion I do not see where order where placed. Please advised.

## 2024-03-04 NOTE — TELEPHONE ENCOUNTER
----- Message from Marilyn Kat sent at 3/4/2024  2:48 PM CST -----  Type:  Needs Medical Advice    Who Called: pt daughter    Would the patient rather a call back or a response via [facility]? Call back   Best Call Back Number:  [phone]  Additional Information: pt is following up on a referral to get a Iron infusion

## 2024-03-05 ENCOUNTER — HOSPITAL ENCOUNTER (OUTPATIENT)
Dept: INTERVENTIONAL RADIOLOGY/VASCULAR | Facility: HOSPITAL | Age: 54
Discharge: HOME | End: 2024-03-05
Attending: OTOLARYNGOLOGY
Payer: COMMERCIAL

## 2024-03-05 VITALS
WEIGHT: 149 LBS | TEMPERATURE: 98 F | OXYGEN SATURATION: 99 % | SYSTOLIC BLOOD PRESSURE: 127 MMHG | HEART RATE: 66 BPM | DIASTOLIC BLOOD PRESSURE: 69 MMHG | RESPIRATION RATE: 17 BRPM | BODY MASS INDEX: 25.44 KG/M2 | HEIGHT: 64 IN

## 2024-03-05 DIAGNOSIS — E04.1 THYROID NODULE: ICD-10-CM

## 2024-03-05 DIAGNOSIS — E07.89 THYROID MASS OF UNCLEAR ETIOLOGY: ICD-10-CM

## 2024-03-05 PROBLEM — D50.0 IRON DEFICIENCY ANEMIA DUE TO CHRONIC BLOOD LOSS: Status: ACTIVE | Noted: 2024-03-05

## 2024-03-05 PROCEDURE — 10005 FNA BX W/US GDN 1ST LES: CPT | Performed by: RADIOLOGY

## 2024-03-05 PROCEDURE — 88177 CYTP FNA EVAL EA ADDL: CPT | Mod: 26,,, | Performed by: PATHOLOGY

## 2024-03-05 PROCEDURE — 25000003 PHARM REV CODE 250: Performed by: PHYSICIAN ASSISTANT

## 2024-03-05 PROCEDURE — 88172 CYTP DX EVAL FNA 1ST EA SITE: CPT | Mod: 26,,, | Performed by: PATHOLOGY

## 2024-03-05 PROCEDURE — 88177 CYTP FNA EVAL EA ADDL: CPT | Mod: 59 | Performed by: PATHOLOGY

## 2024-03-05 PROCEDURE — 88173 CYTOPATH EVAL FNA REPORT: CPT | Mod: 26,,, | Performed by: PATHOLOGY

## 2024-03-05 PROCEDURE — 88172 CYTP DX EVAL FNA 1ST EA SITE: CPT | Performed by: PATHOLOGY

## 2024-03-05 PROCEDURE — 88173 CYTOPATH EVAL FNA REPORT: CPT | Performed by: PATHOLOGY

## 2024-03-05 PROCEDURE — 10005 FNA BX W/US GDN 1ST LES: CPT | Mod: LT,,, | Performed by: PHYSICIAN ASSISTANT

## 2024-03-05 RX ORDER — EPINEPHRINE 0.3 MG/.3ML
0.3 INJECTION SUBCUTANEOUS ONCE AS NEEDED
Status: CANCELLED | OUTPATIENT
Start: 2024-03-05

## 2024-03-05 RX ORDER — HEPARIN 100 UNIT/ML
500 SYRINGE INTRAVENOUS
Status: CANCELLED | OUTPATIENT
Start: 2024-03-05

## 2024-03-05 RX ORDER — LIDOCAINE HYDROCHLORIDE 10 MG/ML
INJECTION INFILTRATION; PERINEURAL
Status: COMPLETED | OUTPATIENT
Start: 2024-03-05 | End: 2024-03-05

## 2024-03-05 RX ORDER — DIPHENHYDRAMINE HYDROCHLORIDE 50 MG/ML
50 INJECTION INTRAMUSCULAR; INTRAVENOUS ONCE AS NEEDED
Status: CANCELLED | OUTPATIENT
Start: 2024-03-05

## 2024-03-05 RX ORDER — SODIUM CHLORIDE 0.9 % (FLUSH) 0.9 %
10 SYRINGE (ML) INJECTION
Status: CANCELLED | OUTPATIENT
Start: 2024-03-05

## 2024-03-05 RX ADMIN — LIDOCAINE HYDROCHLORIDE 5 ML: 10 INJECTION, SOLUTION INFILTRATION; PERINEURAL at 09:03

## 2024-03-05 NOTE — H&P
"Interventional Radiology Pre-Procedure History & Physical      Chief Complaint/Reason for Referral: thyroid nodule    History of Present Illness:  Madelin Hidalgo is a 53 y.o. female who presents for thyroid FNA    Past Medical History:   Diagnosis Date    Ectopic pregnancy     History of ectopic pregnancy 6/25/2018    Iron deficiency      Past Surgical History:   Procedure Laterality Date    THYROID SURGERY      unclear what kind, she is unsure       Allergies:   Review of patient's allergies indicates:  No Known Allergies    Home Meds:   Prior to Admission medications    Medication Sig Start Date End Date Taking? Authorizing Provider   tiZANidine (ZANAFLEX) 4 MG tablet Take 1 tablet (4 mg total) by mouth nightly as needed (muscle spams). 2/26/24  Yes Breanna Keen, DO       Anticoagulation/Antiplatelet Meds: no anticoagulation    Review of Systems:   Hematological: no known coagulopathies  Respiratory: no shortness of breath  Cardiovascular: no chest pain  Gastrointestinal: no abdominal pain  Genitourinary: no dysuria  Musculoskeletal: negative  Neurological: no TIA or stroke symptoms     Physical Exam:  Temp: 98.3 °F (36.8 °C) (03/05/24 0837)  Pulse: 75 (03/05/24 0837)  Resp: 14 (03/05/24 0837)  BP: 133/69 (03/05/24 0837)  SpO2: 100 % (03/05/24 0837)    General: WNWD, NAD  HEENT: Normocephalic, sclera anicteric  Neck: Supple  Heart: RRR by pulse  Lungs: Symmetric excursions, breathing unlabored  Abd: Nondistended  Extremities: MAEW  Neuro: AA x 3    Laboratory:  No results found for: "INR", "PT", "PTT"    Lab Results   Component Value Date    WBC 6.79 02/27/2024    HGB 9.8 (L) 02/27/2024    HCT 31.4 (L) 02/27/2024    MCV 88 02/27/2024     (H) 02/27/2024      Lab Results   Component Value Date     (H) 02/27/2024     (H) 02/27/2024    K 3.9 02/27/2024     02/27/2024    CO2 27 02/27/2024    BUN 13 02/27/2024    CREATININE 0.57 02/27/2024    CALCIUM 8.7 02/27/2024    MG 2.3 03/05/2023    " ALT 35 02/27/2024    AST 31 02/27/2024    ALBUMIN 4.2 02/27/2024    BILITOT 0.4 02/27/2024       Imaging:  US 2/21/24  reviewed.    Assessment/Plan:  53 y.o. female with L thyroid nodule. Will undergo FNA today.    Sedation plan: None    Risks (including, but not limited to, pain, bleeding, infection, damage to nearby structures, treatment failure/recurrence, and the need for additional procedures), potential benefits, and alternatives were discussed with the patient. All questions were answered to the best of my abilities. The patient wishes to proceed. Written informed consent was obtained.    Cindy Chava, PA-C [facility]

## 2024-03-05 NOTE — PROCEDURES
Interventional Radiology Immediate Post-Procedure Note    Pre-Op Diagnosis: Thyroid Nodule  Post-Op Diagnosis: Same    Procedure:     Procedure performed by: Elsy Horta PA-C  Assistants: None    Estimated Blood Loss: Minimal  Specimen Removed: Yes    Findings/description of procedure:  25 g FNA x 6 passes made through nodule in the L lobe.  Adequacy of specimen not confirmed.    No immediate complications. Patient tolerated procedure well. Please see full dictated procedure report for additional details and recommendations.      Cindy Chava, PA-C [facility]

## 2024-03-05 NOTE — TELEPHONE ENCOUNTER
Spoke with pt's daughter in regards of message. Pt's daughter verbalized understanding of message.     left upper extremity normal tone/spasticity/severely increased tone/right

## 2024-03-05 NOTE — DISCHARGE SUMMARY
Interventional Radiology Short Stay Discharge Summary      Admit Date: 3/5/2024  Discharge Date: 03/05/2024     Hospital Course: Uneventful    Discharge Diagnosis: thyroid nodule    Discharge Condition: Stable    Discharge Disposition: Home    Diet: Resume prior diet    Activity: activity as tolerated    Follow-up: With referring provider    Cindy Chava, PA-C [facility]

## 2024-03-05 NOTE — SEDATION DOCUMENTATION
Procedure complete. Patient alert and oriented x4 unlabored on Room Air. Patient denies pain and is resting comfortably. Surgical site dressed with band aid. Dressing is clean, dry, and intact. Patient transported to recovery.

## 2024-03-07 LAB
ADEQUACY: ABNORMAL
FINAL PATHOLOGIC DIAGNOSIS: ABNORMAL
Lab: ABNORMAL

## 2024-03-19 ENCOUNTER — PATIENT MESSAGE (OUTPATIENT)
Dept: OTOLARYNGOLOGY | Facility: CLINIC | Age: 54
End: 2024-03-19
Payer: COMMERCIAL

## 2024-03-19 DIAGNOSIS — E07.89 THYROID MASS OF UNCLEAR ETIOLOGY: Primary | ICD-10-CM

## 2024-03-19 NOTE — TELEPHONE ENCOUNTER
Referral placed to head and neck due to atypia of undetermined significance on FNA biopsy and history of prior surgery in thyroid region in [place] years ago.

## 2024-03-20 ENCOUNTER — OFFICE VISIT (OUTPATIENT)
Dept: OTOLARYNGOLOGY | Facility: CLINIC | Age: 54
End: 2024-03-20
Payer: COMMERCIAL

## 2024-03-20 VITALS
SYSTOLIC BLOOD PRESSURE: 126 MMHG | BODY MASS INDEX: 28.08 KG/M2 | WEIGHT: 163.56 LBS | DIASTOLIC BLOOD PRESSURE: 85 MMHG

## 2024-03-20 DIAGNOSIS — E07.89 THYROID MASS OF UNCLEAR ETIOLOGY: ICD-10-CM

## 2024-03-20 PROCEDURE — 1160F RVW MEDS BY RX/DR IN RCRD: CPT | Mod: CPTII,S$GLB,, | Performed by: OTOLARYNGOLOGY

## 2024-03-20 PROCEDURE — 31575 DIAGNOSTIC LARYNGOSCOPY: CPT | Mod: S$GLB,,, | Performed by: OTOLARYNGOLOGY

## 2024-03-20 PROCEDURE — 3044F HG A1C LEVEL LT 7.0%: CPT | Mod: CPTII,S$GLB,, | Performed by: OTOLARYNGOLOGY

## 2024-03-20 PROCEDURE — 3008F BODY MASS INDEX DOCD: CPT | Mod: CPTII,S$GLB,, | Performed by: OTOLARYNGOLOGY

## 2024-03-20 PROCEDURE — 99214 OFFICE O/P EST MOD 30 MIN: CPT | Mod: 25,S$GLB,, | Performed by: OTOLARYNGOLOGY

## 2024-03-20 PROCEDURE — 1159F MED LIST DOCD IN RCRD: CPT | Mod: CPTII,S$GLB,, | Performed by: OTOLARYNGOLOGY

## 2024-03-20 PROCEDURE — 3079F DIAST BP 80-89 MM HG: CPT | Mod: CPTII,S$GLB,, | Performed by: OTOLARYNGOLOGY

## 2024-03-20 PROCEDURE — 3074F SYST BP LT 130 MM HG: CPT | Mod: CPTII,S$GLB,, | Performed by: OTOLARYNGOLOGY

## 2024-03-20 PROCEDURE — 99999 PR PBB SHADOW E&M-EST. PATIENT-LVL III: CPT | Mod: PBBFAC,,, | Performed by: OTOLARYNGOLOGY

## 2024-03-20 NOTE — PROGRESS NOTES
History of Present Illness:   Madelin Hidalgo is a 53 y.o. year old female evaluated in the Otolaryngology-Head and Neck Surgery Clinic at [facility]. The patient was referred by Dr. Oneyda Rebollar for evaluation of thyroid nodule with FNA showing atypia of undetermined significance.  Patient has a known history of thyroid problems with surgery in [place] about 10 years ago with excision of what she refers to as a cyst.  This was from a mini incision.  She denies any family history of thyroid carcinoma.  She denies any personal history of radiation.  She does not feel like the area has grown significantly.  She underwent evaluation by Dr. Rebollar and was found to have atypia of undetermined significance on FNA.  Of note interviewed done with 2 different interpreters below with ID numbers  Edgar ID number [id number]  Haven ID# [id number]            Past Medical/Surgical History  Past Medical History:   Diagnosis Date    Ectopic pregnancy     History of ectopic pregnancy 6/25/2018    Iron deficiency      Her  has a past surgical history that includes Thyroid surgery.     Past Family/Social History  Her family history includes Esophageal cancer in her paternal grandfather; Hypertension in her mother; No Known Problems in her brother, brother, father, sister, and sister.  She  reports that she has never smoked. She has never used smokeless tobacco. She reports that she does not drink alcohol and does not use drugs.     Medications/Allergies/Immunizations  Her current medication(s) include:   Current Outpatient Medications   Medication Sig Dispense Refill    tiZANidine (ZANAFLEX) 4 MG tablet Take 1 tablet (4 mg total) by mouth nightly as needed (muscle spams). 60 tablet 1     No current facility-administered medications for this visit.        Allergies: Patient has no known allergies.     Immunizations:   Immunization History   Administered Date(s) Administered    COVID-19, MRNA, LN-S, PF (Pfizer) (Purple Cap)  03/23/2021, 04/20/2021, 11/13/2021    Influenza - Quadrivalent - MDCK - PF 10/12/2019, 10/24/2020, 10/03/2021    Tdap 06/25/2018, 10/24/2020         Review of Systems   Constitutional: Negative for fever, weight loss and weight gain.  Skin: Negative for rash, itchiness, dryness  HENT:  As per HPI  Cardiovascular: Negative for chest pain and dyspnea on exertion .   Respiratory: Is not experiencing shortness of breath.   Gastrointestinal: Negative for nausea and vomiting.   Neurological: Negative for headaches.   Lymph/Heme: Negative for lymphadenopathy or easy bruising  Musculoskeletal: Negative for joint or muscle pain  Psychiatric: The patient is not nervous/anxious.        All other systems are negative except for that listed in the HPI.      PHYSICAL EXAM:   Vital Signs:  /85   Wt 74.2 kg (163 lb 9.3 oz)   LMP 02/14/2024 (Approximate)   BMI 28.08 kg/m²      General:  Well-developed, well-nourished  Communication and Voice:  Clear pitch and clarity  Hearing: Hearing adequate for verbal communication bilaterally   Inspection:  Normocephalic and atraumatic without mass or lesion  Palpation:  Facial skeleton intact without bony stepoffs  Parotid Glands:  No mass or tenderness  Facial Strength:  Facial motility symmetric and full bilaterally  Pinna:  External ear intact and fully developed  External canal:  Canal is patent with intact skin  Tympanic Membrane:  Clear and mobile  External nose:  No scar or anatomic deformity  Internal Nose:  Septum intact and midline.  No edema, polyp, or rhinorrhea.  TMJ:  No pain to palpation with full mobility  Oral cavity, Lips, Teeth, and Gums:  Mucosa and teeth intact and viable, No lesions, masses or ulcers  Oropharynx: No erythema or exudate, no masses or ulcerations, non-obstructive tonsils  Nasopharynx:  No mass or lesion with intact mucosa  Hypopharynx:  Not well visualized secondary to gagging  Larynx:  Not well visualized secondary to gagging  Neck, Trachea,  Lymphatics:  Midline trachea without mass or lesion, no lymphadenopathy  Thyroid:  Fullness left thyroid small 2 cm incision to right paratracheal area above cricoid well healed  Eyes: No nystagmus with equal extraocular motion bilaterally  Neuro/Psych/Balance: Patient oriented and appropriate in interaction;  Appropriate mood and affect;  Gait is intact with no imbalance; Cranial nerves I-XII are intact  Respiratory effort:  Equal inspiration and expiration without stridor  Peripheral Vascular:  Warm extremities with equal pulses     Procedure: Flexible fiberoptic nasopharyngoscopy/laryngoscopy   Indications: Need for detailed exam, hyperactive gag reflex, inadequate mirror visualization.  Surgeon: Weston Calvillo MD  Procedure note/findings: After informed discussion of the risks, benefits, and alternatives after indications as noted above, a fiberoptic nasopharyngoscopy and laryngoscopy was recommended for above indications, and the patient consented to this. Nasal cavities were topically anesthetized and decongested with 4% lidocaine and Afrin solutions after which a flexible scope was easily advanced without difficulty into the left and right nasal cavities as well as into the nasopharynx. Nasal cavities were intact without gross mass or lesion. Nasopharynx, similarly, revealed no mass lesion or granularity. There was no ulceration. There was no gross asymmetry. Fossa of Rosenmueller was intact bilaterally. The eustachian tube orifices were intact bilaterally and patent. Posterior and lateral nasal pharyngeal walls were intact and symmetric without ulceration, mass, or granularity. Scope was now advanced distally. Visible oropharynx including lateral walls and tongue base was clear without lesion. Larynx and hypopharynx were examined. Larynx was intact with normal true vocal cord mobility bilaterally. No discrete masses or lesions in any location. Hypopharynx was clear without asymmetry.  Airway was patent. The  scope was then withdrawn and removed. She tolerated this well.           PATHOLOGY REVIEW:    FNA thyroid 03/05/2024     Abnormal   Thyroid gland, left thyroid nodule, ultrasound-guided fine-needle aspiration (FNA) biopsy with pathologist adequacy:  Atypia of undetermined significance (AUS) (The [place] System for Reporting Thyroid Cytopathology)  Adequately cellular specimen comprised of some benign follicular cell groups and some follicular cell groups with architectural and cytologic atypia  Abundant background macrophages     RADIOLOGIC REVIEW:    I have personally reviewed the imaging including the CT scan and went over images with the patient well-defined solid cyst in the left thyroid  CT neck without contrast 02/15/2024  FINDINGS:  There is a large left thyroid lobe mass or hyperdense cyst measuring 3.0 x 2.6 x 2.5 cm (cc by AP by TR), with slight mass effect on the trachea which demonstrates mild rightward deviation and is otherwise patent.  No additional masses are seen in the neck soft tissues.  There is no evidence of significant cervical lymphadenopathy.  The oropharynx is clear.  The epiglottis is normal.  The parapharyngeal and retropharyngeal spaces are unremarkable.  The palatine tonsils are normal.  The trachea is patent and unremarkable.  The bilateral lung apices are clear.  Osseous structures are intact.  No aggressive osseous lesion seen.  There is mild occult thickening of the floors of the bilateral maxillary sinuses.  Remaining paranasal sinuses and mastoid air cells are clear.  Partially imaged portions of the brain parenchyma are unremarkable.     Impression:     Mass or hyperdense cyst in the left thyroid lobe as above.  Recommend further evaluation with thyroid ultrasound.   Ultrasound thyroid 02/21/2024   Impression:     Mixed solid, cystic complex nodule at the left thyroid lobe which meets criteria for FNA.     Isoechoic, solid nodule at the right thyroid lobe which does not meet  criteria for FNA or follow-up.     ASSESSMENT:   1. Thyroid mass of unclear etiology            PLAN:   Different options for management discussed with the patient and significant other at length through the .  I offered her 3 options for management including continued observation with a repeat ultrasound in 6 months verses repeat FNA with possible Afirma testing versus hemithyroidectomy.  Risks benefits and alternatives of thyroidectomy was discussed in detail.  Patient would like to consider options and will call back with decision.      I believe that Ms. Hidalgo has a good understanding of the issues involved and I answered all of her questions.     DISCLAIMER: This note was prepared with [employer] voice recognition transcription software. Garbled syntax, mangled pronouns, and other bizarre constructions may be attributed to that software system. While efforts were made to correct any mistakes made by this voice recognition program, some errors and/or omissions may remain in the note that were missed when the note was originally created.

## 2024-04-04 DIAGNOSIS — E07.89 THYROID MASS OF UNCLEAR ETIOLOGY: Primary | ICD-10-CM

## 2024-04-18 NOTE — ANESTHESIA PAT ROS NOTE
04/18/2024  Madelin Hidalgo is a 53 y.o., female.      Pre-op Assessment          Review of Systems  Anesthesia Hx:  No problems with previous Anesthesia   History of prior surgery of interest to airway management or planning:  Previous anesthesia: General THYROID SURGERY 2012 with general anesthesia.        Denies Family Hx of Anesthesia complications.    Denies Personal Hx of Anesthesia complications.                    Social:  Non-Smoker, No Alcohol Use       Hematology/Oncology:  Hematology Normal   Oncology Normal                                   EENT/Dental:   THYROID MASS OF UNCLEAR ETIOLOOGY          Cardiovascular:            Denies Angina.          Functional Capacity good / => 4 METS                         Pulmonary:       Denies Shortness of breath.  Denies Recent URI.                 Renal/:  Renal/ Normal                 Hepatic/GI:  Hepatic/GI Normal                 Musculoskeletal:  Musculoskeletal Normal                Neurological:  Neurology Normal                                      Endocrine:     HX OF THYROID SURGERY        Psych:  Psychiatric Normal                         Anesthesia Assessment: Preoperative EQUATION    Planned Procedure: Procedure(s) (LRB):  LOBECTOMY, THYROID (Left)  Requested Anesthesia Type:General  Surgeon: Weston Calvillo MD  Service: ENT  Known or anticipated Date of Surgery:4/25/2024    Surgeon notes: reviewed    Electronic QUestionnaire Assessment completed via nurse interview with patient.        Triage considerations:     The patient has no apparent active cardiac condition (No unstable coronary Syndrome such as severe unstable angina or recent [<1 month] myocardial infarction, decompensated CHF, severe valvular   disease or significant arrhythmia)    Previous anesthesia records:Not available    Last PCP note: within 3 months , within [facility]    Subspecialty notes: ENT    Other important co-morbidities:  THYROID MASS OF UNCLEAR ETIOLOGY       Tests already available:  Available tests,  within 3 months , within [facility] .     3/5/24  IR FNA WITH ULTRASOUND    2/27/24  FERRITIN, IRON/TIBS, T3, T4, TSH, LIPID PANEL, A1C, CMP, CBC    2/21/24  US THYROID    2/15/24  CT NECK            Instructions given. (See in Nurse's note)    Optimization:  Anesthesia Preop Clinic Assessment  not Indicated    Medical Opinion not Indicated              Plan:    Testing:  None Needed        Patient  has previously scheduled Medical Appointment: not at this time    Navigation:  Straight Line to surgery.               No tests, anesthesia preop clinic visit, or consult required.

## 2024-04-25 ENCOUNTER — HOSPITAL ENCOUNTER (OUTPATIENT)
Facility: HOSPITAL | Age: 54
Discharge: HOME | End: 2024-04-25
Attending: OTOLARYNGOLOGY | Admitting: OTOLARYNGOLOGY
Payer: COMMERCIAL

## 2024-04-25 ENCOUNTER — ANESTHESIA (OUTPATIENT)
Dept: SURGERY | Facility: HOSPITAL | Age: 54
End: 2024-04-25
Payer: COMMERCIAL

## 2024-04-25 ENCOUNTER — ANESTHESIA EVENT (OUTPATIENT)
Dept: SURGERY | Facility: HOSPITAL | Age: 54
End: 2024-04-25
Payer: COMMERCIAL

## 2024-04-25 VITALS
HEIGHT: 64 IN | OXYGEN SATURATION: 98 % | TEMPERATURE: 98 F | DIASTOLIC BLOOD PRESSURE: 66 MMHG | WEIGHT: 149 LBS | RESPIRATION RATE: 12 BRPM | BODY MASS INDEX: 25.44 KG/M2 | HEART RATE: 88 BPM | SYSTOLIC BLOOD PRESSURE: 117 MMHG

## 2024-04-25 DIAGNOSIS — E04.1 THYROID NODULE: ICD-10-CM

## 2024-04-25 DIAGNOSIS — E07.89 THYROID MASS OF UNCLEAR ETIOLOGY: Primary | ICD-10-CM

## 2024-04-25 LAB
B-HCG UR QL: NEGATIVE
CTP QC/QA: YES

## 2024-04-25 PROCEDURE — 88305 TISSUE EXAM BY PATHOLOGIST: CPT | Mod: 26,,, | Performed by: PATHOLOGY

## 2024-04-25 PROCEDURE — 63600175 PHARM REV CODE 636 W HCPCS

## 2024-04-25 PROCEDURE — C1729 CATH, DRAINAGE: HCPCS | Performed by: OTOLARYNGOLOGY

## 2024-04-25 PROCEDURE — 36000707: Performed by: OTOLARYNGOLOGY

## 2024-04-25 PROCEDURE — D9220A PRA ANESTHESIA: Mod: ANES,,, | Performed by: STUDENT IN AN ORGANIZED HEALTH CARE EDUCATION/TRAINING PROGRAM

## 2024-04-25 PROCEDURE — 88307 TISSUE EXAM BY PATHOLOGIST: CPT | Performed by: PATHOLOGY

## 2024-04-25 PROCEDURE — 71000033 HC RECOVERY, INTIAL HOUR: Performed by: OTOLARYNGOLOGY

## 2024-04-25 PROCEDURE — D9220A PRA ANESTHESIA: Mod: CRNA,,, | Performed by: NURSE ANESTHETIST, CERTIFIED REGISTERED

## 2024-04-25 PROCEDURE — 94761 N-INVAS EAR/PLS OXIMETRY MLT: CPT

## 2024-04-25 PROCEDURE — 88307 TISSUE EXAM BY PATHOLOGIST: CPT | Mod: 26,,, | Performed by: PATHOLOGY

## 2024-04-25 PROCEDURE — 27000221 HC OXYGEN, UP TO 24 HOURS

## 2024-04-25 PROCEDURE — 63600175 PHARM REV CODE 636 W HCPCS: Performed by: NURSE ANESTHETIST, CERTIFIED REGISTERED

## 2024-04-25 PROCEDURE — 71000016 HC POSTOP RECOV ADDL HR: Performed by: OTOLARYNGOLOGY

## 2024-04-25 PROCEDURE — 60220 PARTIAL REMOVAL OF THYROID: CPT | Mod: LT,,, | Performed by: OTOLARYNGOLOGY

## 2024-04-25 PROCEDURE — 88305 TISSUE EXAM BY PATHOLOGIST: CPT | Performed by: PATHOLOGY

## 2024-04-25 PROCEDURE — 37000008 HC ANESTHESIA 1ST 15 MINUTES: Performed by: OTOLARYNGOLOGY

## 2024-04-25 PROCEDURE — 36000706: Performed by: OTOLARYNGOLOGY

## 2024-04-25 PROCEDURE — 37000009 HC ANESTHESIA EA ADD 15 MINS: Performed by: OTOLARYNGOLOGY

## 2024-04-25 PROCEDURE — 25000003 PHARM REV CODE 250: Performed by: STUDENT IN AN ORGANIZED HEALTH CARE EDUCATION/TRAINING PROGRAM

## 2024-04-25 PROCEDURE — 81025 URINE PREGNANCY TEST: CPT | Performed by: OTOLARYNGOLOGY

## 2024-04-25 PROCEDURE — 71000015 HC POSTOP RECOV 1ST HR: Performed by: OTOLARYNGOLOGY

## 2024-04-25 PROCEDURE — 25000003 PHARM REV CODE 250: Performed by: NURSE ANESTHETIST, CERTIFIED REGISTERED

## 2024-04-25 RX ORDER — KETAMINE HCL IN 0.9 % NACL 50 MG/5 ML
SYRINGE (ML) INTRAVENOUS
Status: DISCONTINUED | OUTPATIENT
Start: 2024-04-25 | End: 2024-04-25

## 2024-04-25 RX ORDER — SODIUM CHLORIDE 0.9 % (FLUSH) 0.9 %
10 SYRINGE (ML) INJECTION
Status: DISCONTINUED | OUTPATIENT
Start: 2024-04-25 | End: 2024-04-25 | Stop reason: HOSPADM

## 2024-04-25 RX ORDER — DEXAMETHASONE SODIUM PHOSPHATE 4 MG/ML
INJECTION, SOLUTION INTRA-ARTICULAR; INTRALESIONAL; INTRAMUSCULAR; INTRAVENOUS; SOFT TISSUE
Status: DISCONTINUED | OUTPATIENT
Start: 2024-04-25 | End: 2024-04-25

## 2024-04-25 RX ORDER — ONDANSETRON HYDROCHLORIDE 2 MG/ML
INJECTION, SOLUTION INTRAVENOUS
Status: DISCONTINUED | OUTPATIENT
Start: 2024-04-25 | End: 2024-04-25

## 2024-04-25 RX ORDER — HYDROCODONE BITARTRATE AND ACETAMINOPHEN 5; 325 MG/1; MG/1
1 TABLET ORAL EVERY 4 HOURS PRN
Status: DISCONTINUED | OUTPATIENT
Start: 2024-04-25 | End: 2024-04-25 | Stop reason: HOSPADM

## 2024-04-25 RX ORDER — ACETAMINOPHEN 10 MG/ML
INJECTION, SOLUTION INTRAVENOUS
Status: DISCONTINUED | OUTPATIENT
Start: 2024-04-25 | End: 2024-04-25

## 2024-04-25 RX ORDER — HALOPERIDOL 5 MG/ML
0.5 INJECTION INTRAMUSCULAR EVERY 10 MIN PRN
Status: DISCONTINUED | OUTPATIENT
Start: 2024-04-25 | End: 2024-04-25 | Stop reason: HOSPADM

## 2024-04-25 RX ORDER — PHENYLEPHRINE HYDROCHLORIDE 10 MG/ML
INJECTION INTRAVENOUS
Status: DISCONTINUED | OUTPATIENT
Start: 2024-04-25 | End: 2024-04-25

## 2024-04-25 RX ORDER — PROPOFOL 10 MG/ML
VIAL (ML) INTRAVENOUS
Status: DISCONTINUED | OUTPATIENT
Start: 2024-04-25 | End: 2024-04-25

## 2024-04-25 RX ORDER — MIDAZOLAM HYDROCHLORIDE 1 MG/ML
INJECTION INTRAMUSCULAR; INTRAVENOUS
Status: DISCONTINUED | OUTPATIENT
Start: 2024-04-25 | End: 2024-04-25

## 2024-04-25 RX ORDER — SUCCINYLCHOLINE CHLORIDE 20 MG/ML
INJECTION INTRAMUSCULAR; INTRAVENOUS
Status: DISCONTINUED | OUTPATIENT
Start: 2024-04-25 | End: 2024-04-25

## 2024-04-25 RX ORDER — HYDROCODONE BITARTRATE AND ACETAMINOPHEN 5; 325 MG/1; MG/1
1 TABLET ORAL EVERY 6 HOURS PRN
Qty: 20 TABLET | Refills: 0 | Status: SHIPPED | OUTPATIENT
Start: 2024-04-25

## 2024-04-25 RX ORDER — FENTANYL CITRATE 50 UG/ML
25 INJECTION, SOLUTION INTRAMUSCULAR; INTRAVENOUS EVERY 5 MIN PRN
Status: DISCONTINUED | OUTPATIENT
Start: 2024-04-25 | End: 2024-04-25 | Stop reason: HOSPADM

## 2024-04-25 RX ORDER — CEFAZOLIN SODIUM 1 G/3ML
INJECTION, POWDER, FOR SOLUTION INTRAMUSCULAR; INTRAVENOUS
Status: DISCONTINUED | OUTPATIENT
Start: 2024-04-25 | End: 2024-04-25

## 2024-04-25 RX ORDER — EPHEDRINE SULFATE 50 MG/ML
INJECTION, SOLUTION INTRAVENOUS
Status: DISCONTINUED | OUTPATIENT
Start: 2024-04-25 | End: 2024-04-25

## 2024-04-25 RX ORDER — VASOPRESSIN 20 [USP'U]/ML
INJECTION, SOLUTION INTRAMUSCULAR; SUBCUTANEOUS
Status: DISCONTINUED | OUTPATIENT
Start: 2024-04-25 | End: 2024-04-25

## 2024-04-25 RX ORDER — ROCURONIUM BROMIDE 10 MG/ML
INJECTION, SOLUTION INTRAVENOUS
Status: DISCONTINUED | OUTPATIENT
Start: 2024-04-25 | End: 2024-04-25

## 2024-04-25 RX ORDER — ONDANSETRON 4 MG/1
4 TABLET, FILM COATED ORAL EVERY 8 HOURS PRN
Qty: 10 TABLET | Refills: 0 | Status: SHIPPED | OUTPATIENT
Start: 2024-04-25

## 2024-04-25 RX ORDER — LIDOCAINE HYDROCHLORIDE 20 MG/ML
INJECTION INTRAVENOUS
Status: DISCONTINUED | OUTPATIENT
Start: 2024-04-25 | End: 2024-04-25

## 2024-04-25 RX ORDER — FENTANYL CITRATE 50 UG/ML
INJECTION, SOLUTION INTRAMUSCULAR; INTRAVENOUS
Status: DISCONTINUED | OUTPATIENT
Start: 2024-04-25 | End: 2024-04-25

## 2024-04-25 RX ADMIN — CEFAZOLIN 2 G: 330 INJECTION, POWDER, FOR SOLUTION INTRAMUSCULAR; INTRAVENOUS at 10:04

## 2024-04-25 RX ADMIN — EPHEDRINE SULFATE 5 MG: 50 INJECTION INTRAVENOUS at 10:04

## 2024-04-25 RX ADMIN — ACETAMINOPHEN 1000 MG: 10 INJECTION, SOLUTION INTRAVENOUS at 10:04

## 2024-04-25 RX ADMIN — FENTANYL CITRATE 25 MCG: 50 INJECTION INTRAMUSCULAR; INTRAVENOUS at 01:04

## 2024-04-25 RX ADMIN — SODIUM CHLORIDE: 0.9 INJECTION, SOLUTION INTRAVENOUS at 10:04

## 2024-04-25 RX ADMIN — PHENYLEPHRINE HYDROCHLORIDE 100 MCG: 10 INJECTION INTRAVENOUS at 10:04

## 2024-04-25 RX ADMIN — PHENYLEPHRINE HYDROCHLORIDE 100 MCG: 10 INJECTION INTRAVENOUS at 11:04

## 2024-04-25 RX ADMIN — SUCCINYLCHOLINE CHLORIDE 120 MG: 20 INJECTION, SOLUTION INTRAMUSCULAR; INTRAVENOUS at 10:04

## 2024-04-25 RX ADMIN — EPHEDRINE SULFATE 5 MG: 50 INJECTION INTRAVENOUS at 11:04

## 2024-04-25 RX ADMIN — VASOPRESSIN 1 UNITS: 20 INJECTION INTRAVENOUS at 11:04

## 2024-04-25 RX ADMIN — HALOPERIDOL LACTATE 0.5 MG: 5 INJECTION, SOLUTION INTRAMUSCULAR at 03:04

## 2024-04-25 RX ADMIN — PROPOFOL 150 MG: 10 INJECTION, EMULSION INTRAVENOUS at 10:04

## 2024-04-25 RX ADMIN — FENTANYL CITRATE 100 MCG: 50 INJECTION, SOLUTION INTRAMUSCULAR; INTRAVENOUS at 10:04

## 2024-04-25 RX ADMIN — FENTANYL CITRATE 25 MCG: 50 INJECTION INTRAMUSCULAR; INTRAVENOUS at 12:04

## 2024-04-25 RX ADMIN — Medication 20 MG: at 10:04

## 2024-04-25 RX ADMIN — LIDOCAINE HYDROCHLORIDE 50 MG: 20 INJECTION INTRAVENOUS at 10:04

## 2024-04-25 RX ADMIN — ROCURONIUM BROMIDE 5 MG: 10 INJECTION, SOLUTION INTRAVENOUS at 10:04

## 2024-04-25 RX ADMIN — MIDAZOLAM HYDROCHLORIDE 2 MG: 2 INJECTION, SOLUTION INTRAMUSCULAR; INTRAVENOUS at 10:04

## 2024-04-25 RX ADMIN — ONDANSETRON 4 MG: 2 INJECTION INTRAMUSCULAR; INTRAVENOUS at 11:04

## 2024-04-25 RX ADMIN — DEXAMETHASONE SODIUM PHOSPHATE 8 MG: 4 INJECTION, SOLUTION INTRAMUSCULAR; INTRAVENOUS at 10:04

## 2024-04-25 RX ADMIN — HYDROCODONE BITARTRATE AND ACETAMINOPHEN 1 TABLET: 5; 325 TABLET ORAL at 12:04

## 2024-04-25 RX ADMIN — SODIUM CHLORIDE 0.15 MCG/KG/MIN: 9 INJECTION, SOLUTION INTRAVENOUS at 10:04

## 2024-04-25 NOTE — DISCHARGE INSTRUCTIONS
Discharge Instructions  Otolaryngology - Head & Neck Surgery      IF URGENT POSTOPERATIVE CONCERNS/QUESTIONS CALL THE  [phone] AND ASK FOR ENT ON CALL.      Follow up appointment  - Your follow-up appointment with ENT will be in 1-2 weeks.   - If you do not know your f/u appt date or do not receive a call/notification about your follow-up appointment within 5 days following discharge, please call the clinic during business hours at [phone].    Medications/Pain control  - Alternate the use of acetaminophen (Tylenol®) and ibuprofen (Advil®, Motrin®) every 4-6 hours to control your pain (unless otherwise instructed not to take these pain medications by another provider). A low-grade fever (101 degrees or less) following surgery may occur and should be treated with acetaminophen. Follow the directions on the bottle. If the fever persists (more than 2 days) or is greater than 102 degrees, call our office.   - For pain that continues despite over the counter pain medication, take your narcotic pain medication (Ex: Norco, Hydrocodone, Oxycodone).   - Constipation is common especially when taking narcotic pain medication. You should drink plenty of liquids and eat a diet high in fiber. You may take a stool softener for a short time while on the pain medication. Avoid laxatives.  - Do not drive a car, operate machinery/power tools, drink alcohol including beer, make important decisions or sign legal documents while using narcotic pain medication.  - If prescribed, please take your antibiotic until all of the pills are finished  - avoid taking antibiotics on an empty stomach to prevent nausea/vomiting    Diet  - Resume your regular diet following surgery or if experiencing nausea eat a light diet as tolerated.   - Avoid spicy, greasy, fried or gaseous foods.   - If you experience any nausea, fluids that are clear and high in sugar are recommended (Gatorade, soda, Rolan-aid, nonacidic  juices).    Activity  - Please do not lift anything heavier than 10 pounds (2 gallons of milk) for 2 weeks  - No straining or exercising for 2 weeks  - Continue your regular activities as you can tolerate them   - Walking around and moving about is strongly encouraged     Wound care  - OK to shower 48 hours after surgery, but do not abrasively rub your incision site  - Avoid swimming or soaking (including submerging in bathtub) your incision  - The steri-strips (white bandages) will begin to peel off on it's own in the next week or so      When to call the clinic or return to a hospital/ED  - If there's any redness or drainage from your incision, concern for significant bleeding, or if you have fever greater than 101.5, call the office immediately or go to the emergency room.  - If you have increased swelling around your neck or difficulty breathing, report to the nearest emergency room.       You can resume any blood thinners (aspirin, plavix, eliquis, warfarin, etc.) 48 hours after surgery, unless told otherwise.

## 2024-04-25 NOTE — ANESTHESIA PREPROCEDURE EVALUATION
"                                                                                                             04/25/2024  Madelin Hidalgo is a 53 y.o., female.    Pre-operative evaluation for Procedure(s) (LRB):  LOBECTOMY, THYROID (Left)    Patient Active Problem List   Diagnosis    H/O thyroid nodule    History of iron deficiency    Vitamin D deficiency    Elevated fasting glucose    Iron deficiency anemia due to chronic blood loss            Peripheral IV - Single Lumen 03/18/24 0816 24 G Left Antecubital (Active)   Number of days: 37       No medications prior to admission.       Review of patient's allergies indicates:  No Known Allergies    Past Medical History:   Diagnosis Date    Ectopic pregnancy     History of ectopic pregnancy 6/25/2018    Iron deficiency      Past Surgical History:   Procedure Laterality Date    THYROID SURGERY      unclear what kind, she is unsure     Tobacco Use    Smoking status: Never    Smokeless tobacco: Never   Substance and Sexual Activity    Alcohol use: No    Drug use: No    Sexual activity: Yes     Partners: Male     Birth control/protection: None       Objective:     Vital Signs (Most Recent):    Vital Signs (24h Range):           There is no height or weight on file to calculate BMI.        Significant Labs:  None    CBC: No results for input(s): "WBC", "RBC", "HGB", "HCT", "PLT", "MCV", "MCH", "MCHC" in the last 72 hours.    CMP: No results for input(s): "NA", "K", "CL", "CO2", "BUN", "CREATININE", "GLU", "MG", "PHOS", "CALCIUM", "ALBUMIN", "PROT", "ALKPHOS", "ALT", "AST", "BILITOT" in the last 72 hours.    INR  No results for input(s): "PT", "INR", "PROTIME", "APTT" in the last 72 hours.      Pre-op Assessment    I have reviewed the Patient Summary Reports.     I have reviewed the Nursing Notes. I have reviewed the NPO Status.   I have reviewed the Medications.     Review of Systems  Anesthesia Hx:             Denies Family Hx of Anesthesia complications.    Denies Personal Hx " of Anesthesia complications.                        Physical Exam  General: Well nourished    Airway:  Mallampati: II   Mouth Opening: Normal  TM Distance: Normal  Tongue: Normal  Neck ROM: Normal ROM    Dental:  Any loose and/or missing teeth verified with patient   Chest/Lungs:  Clear to auscultation    Heart:  Rate: Normal  Rhythm: Regular Rhythm  Sounds: Normal    Abdomen:  Normal        Anesthesia Plan  Type of Anesthesia, risks & benefits discussed:    Anesthesia Type: Gen ETT, Gen Supraglottic Airway, Gen Natural Airway, MAC  Intra-op Monitoring Plan: Standard ASA Monitors  Post Op Pain Control Plan: multimodal analgesia and IV/PO Opioids PRN  Induction:  IV  Informed Consent: Informed consent signed with the Patient and all parties understand the risks and agree with anesthesia plan.  All questions answered.   ASA Score: 3    Ready For Surgery From Anesthesia Perspective.     .

## 2024-04-25 NOTE — ANESTHESIA PROCEDURE NOTES
Intubation    Date/Time: 4/25/2024 10:32 AM    Performed by: Dewayne Eli CRNA  Authorized by: Yaron Hodge MD    Intubation:     Induction:  Intravenous    Intubated:  Postinduction    Mask Ventilation:  Easy mask    Attempts:  1    Attempted By:  CRNA    Method of Intubation:  Video laryngoscopy    Blade:  Cook 3    Laryngeal View Grade: Grade I - full view of cords      Difficult Airway Encountered?: No      Complications:  None    Airway Device:  EMG ETT (NIMS)    Airway Device Size:  7.0    Style/Cuff Inflation:  Cuffed (inflated to minimal occlusive pressure)    Inflation Amount (mL):  7    Tube secured:  20    Secured at:  The lips    Placement Verified By:  Capnometry    Complicating Factors:  None    Findings Post-Intubation:  BS equal bilateral and atraumatic/condition of teeth unchanged

## 2024-04-25 NOTE — TRANSFER OF CARE
"Anesthesia Transfer of Care Note    Patient: Madelin Hidalgo    Procedure(s) Performed: Procedure(s) (LRB):  LOBECTOMY, THYROID (Left)    Patient location: PACU    Anesthesia Type: general    Transport from OR: Transported from OR on 6-10 L/min O2 by face mask with adequate spontaneous ventilation    Post pain: adequate analgesia    Post assessment: no apparent anesthetic complications and tolerated procedure well    Post vital signs: stable    Level of consciousness: awake    Nausea/Vomiting: no nausea/vomiting    Complications: none    Transfer of care protocol was followed      Last vitals: Visit Vitals  /72   Pulse 74   Temp 36.6 °C (97.9 °F) (Oral)   Resp 17   Ht 5' 4" (1.626 m)   Wt 67.6 kg (149 lb)   SpO2 100%   Breastfeeding No   BMI 25.58 kg/m²     "

## 2024-04-25 NOTE — NURSING TRANSFER
Nursing Transfer Note      4/25/2024   1:26 PM    Nurse giving handoff:Mariely WALLACE PACU   Nurse receiving handoff:Rj WALLACE [facility]    Reason patient is being transferred: s/p anesthesia    Transfer To: [facility] 30    Transfer via stretcher    Transported by this RN    Transfer Vital Signs: see flowsheet    Medicines sent: none    Any special needs or follow-up needed: discharge    Patient belongings transferred with patient: No    Chart send with patient: Yes    Notified: friend    Patient reassessed at: 4/25/24 at 1245 (date, time)    Upon arrival to floor: cardiac monitor applied, patient oriented to room, and bed in lowest position

## 2024-04-25 NOTE — BRIEF OP NOTE
[facility] - Surgery ([facility])  Brief Operative Note    Surgery Date: 4/25/2024     Surgeons and Role:     * Weston Calvillo MD - Primary     * Hilary Matos MD - Resident - Assisting        Pre-op Diagnosis:  Thyroid mass of unclear etiology [E07.89]    Post-op Diagnosis:  Post-Op Diagnosis Codes:     * Thyroid mass of unclear etiology [E07.89]    Procedure(s) (LRB):  LOBECTOMY, THYROID (Left)    Anesthesia: General    Operative Findings: Left thyroid lobectomy    Estimated Blood Loss: 20 mL         Specimens:   Specimen (24h ago, onward)       Start     Ordered    04/25/24 1058  Specimen to Pathology, Surgery ENT  Once        Comments: Pre-op Diagnosis: Thyroid mass of unclear etiology [E07.89]Procedure(s):LOBECTOMY, THYROID Number of specimens: 2Name of specimens: 1. DELPHIAN LYMPH NODE2. LEFT THYROID LOBE, SHORT STITCH - SUPERIOR, LONG STITCH, LATERAL - PERMANENT     References:    Click here for ordering Quick Tip   Question Answer Comment   Procedure Type: ENT    Release to patient Immediate        04/25/24 1157                      Discharge Note    OUTCOME: Patient tolerated treatment/procedure well without complication and is now ready for discharge.    DISPOSITION: Home or Self Care    FINAL DIAGNOSIS:  Thyroid mass of unclear etiology    FOLLOWUP: In clinic    DISCHARGE INSTRUCTIONS:    Discharge Procedure Orders   Diet general     Call MD for:  severe uncontrolled pain     Call MD for:  difficulty breathing, headache or visual disturbances

## 2024-04-25 NOTE — H&P
History of Present Illness:   Madelin Hidalgo is a 53 y.o. year old female evaluated in the Otolaryngology-Head and Neck Surgery Clinic at [facility]. The patient was referred by Dr. Calvillo for evaluation of thyroid nodule with FNA showing atypia of undetermined significance.  Patient has a known history of thyroid problems with surgery in [place] about 10 years ago with excision of what she refers to as a cyst.  This was from a mini incision.  She denies any family history of thyroid carcinoma.  She denies any personal history of radiation.  She does not feel like the area has grown significantly.  She underwent evaluation by Dr. Rebollar and was found to have atypia of undetermined significance on FNA.  Of note interviewed done with 2 different interpreters.    She presents today for surgery for thyroid lobectomy.            Past Medical/Surgical History  Past Medical History:   Diagnosis Date    Ectopic pregnancy     History of ectopic pregnancy 6/25/2018    Iron deficiency      Her  has a past surgical history that includes Thyroid surgery.     Past Family/Social History  Her family history includes Esophageal cancer in her paternal grandfather; Hypertension in her mother; No Known Problems in her brother, brother, father, sister, and sister.  She  reports that she has never smoked. She has never used smokeless tobacco. She reports that she does not drink alcohol and does not use drugs.     Medications/Allergies/Immunizations  Her current medication(s) include:   Current Facility-Administered Medications   Medication Dose Route Frequency Provider Last Rate Last Admin    ceFAZolin 2 g in dextrose 5 % in water (D5W) 50 mL IVPB (MB+)  2 g Intravenous On Call Procedure Hilary Matos MD            Allergies: Patient has no known allergies.     Immunizations:   Immunization History   Administered Date(s) Administered    COVID-19, MRNA, LN-S, PF (Pfizer) (Purple Cap) 03/23/2021, 04/20/2021, 11/13/2021  "   Influenza - Quadrivalent - MDCK - PF 10/12/2019, 10/24/2020, 10/03/2021    Tdap 06/25/2018, 10/24/2020         Review of Systems   Constitutional: Negative for fever, weight loss and weight gain.  Skin: Negative for rash, itchiness, dryness  HENT:  As per HPI  Cardiovascular: Negative for chest pain and dyspnea on exertion .   Respiratory: Is not experiencing shortness of breath.   Gastrointestinal: Negative for nausea and vomiting.   Neurological: Negative for headaches.   Lymph/Heme: Negative for lymphadenopathy or easy bruising  Musculoskeletal: Negative for joint or muscle pain  Psychiatric: The patient is not nervous/anxious.        All other systems are negative except for that listed in the HPI.      PHYSICAL EXAM:   Vital Signs:  /72   Pulse 74   Temp 97.9 °F (36.6 °C) (Oral)   Resp 17   Ht 5' 4" (1.626 m)   Wt 67.6 kg (149 lb)   SpO2 100%   Breastfeeding No   BMI 25.58 kg/m²      General:  Well-developed, well-nourished  Communication and Voice:  Clear pitch and clarity  Hearing: Hearing adequate for verbal communication bilaterally   Inspection:  Normocephalic and atraumatic without mass or lesion  Palpation:  Facial skeleton intact without bony stepoffs  Parotid Glands:  No mass or tenderness  Facial Strength:  Facial motility symmetric and full bilaterally  Pinna:  External ear intact and fully developed  External canal:  Canal is patent with intact skin  Tympanic Membrane:  Clear and mobile  External nose:  No scar or anatomic deformity  Internal Nose:  Septum intact and midline.  No edema, polyp, or rhinorrhea.  TMJ:  No pain to palpation with full mobility  Oral cavity, Lips, Teeth, and Gums:  Mucosa and teeth intact and viable, No lesions, masses or ulcers  Oropharynx: No erythema or exudate, no masses or ulcerations, non-obstructive tonsils  Nasopharynx:  No mass or lesion with intact mucosa  Hypopharynx:  Not well visualized secondary to gagging  Larynx:  Not well visualized " secondary to gagging  Neck, Trachea, Lymphatics:  Midline trachea without mass or lesion, no lymphadenopathy  Thyroid:  Fullness left thyroid small 2 cm incision to right paratracheal area above cricoid well healed  Eyes: No nystagmus with equal extraocular motion bilaterally  Neuro/Psych/Balance: Patient oriented and appropriate in interaction;  Appropriate mood and affect;  Gait is intact with no imbalance; Cranial nerves I-XII are intact  Respiratory effort:  Equal inspiration and expiration without stridor  Peripheral Vascular:  Warm extremities with equal pulses             PATHOLOGY REVIEW:    FNA thyroid 03/05/2024     Abnormal   Thyroid gland, left thyroid nodule, ultrasound-guided fine-needle aspiration (FNA) biopsy with pathologist adequacy:  Atypia of undetermined significance (AUS) (The [employer] System for Reporting Thyroid Cytopathology)  Adequately cellular specimen comprised of some benign follicular cell groups and some follicular cell groups with architectural and cytologic atypia  Abundant background macrophages     RADIOLOGIC REVIEW:    I have personally reviewed the imaging including the CT scan and went over images with the patient well-defined solid cyst in the left thyroid  CT neck without contrast 02/15/2024  FINDINGS:  There is a large left thyroid lobe mass or hyperdense cyst measuring 3.0 x 2.6 x 2.5 cm (cc by AP by TR), with slight mass effect on the trachea which demonstrates mild rightward deviation and is otherwise patent.  No additional masses are seen in the neck soft tissues.  There is no evidence of significant cervical lymphadenopathy.  The oropharynx is clear.  The epiglottis is normal.  The parapharyngeal and retropharyngeal spaces are unremarkable.  The palatine tonsils are normal.  The trachea is patent and unremarkable.  The bilateral lung apices are clear.  Osseous structures are intact.  No aggressive osseous lesion seen.  There is mild occult thickening of the floors of  the bilateral maxillary sinuses.  Remaining paranasal sinuses and mastoid air cells are clear.  Partially imaged portions of the brain parenchyma are unremarkable.     Impression:     Mass or hyperdense cyst in the left thyroid lobe as above.  Recommend further evaluation with thyroid ultrasound.   Ultrasound thyroid 02/21/2024   Impression:     Mixed solid, cystic complex nodule at the left thyroid lobe which meets criteria for FNA.     Isoechoic, solid nodule at the right thyroid lobe which does not meet criteria for FNA or follow-up.     ASSESSMENT:   1. Thyroid nodule            PLAN:   To OR for left thyroid lobectomy

## 2024-04-25 NOTE — ANESTHESIA POSTPROCEDURE EVALUATION
Anesthesia Post Evaluation    Patient: Madelin Hidalgo    Procedure(s) Performed: Procedure(s) (LRB):  LOBECTOMY, THYROID (Left)    Final Anesthesia Type: general      Patient location during evaluation: PACU  Patient participation: Yes- Able to Participate  Level of consciousness: awake and alert  Post-procedure vital signs: reviewed and stable  Pain management: adequate  Airway patency: patent  JULISSA mitigation strategies: Extubation while patient is awake  PONV status at discharge: No PONV  Anesthetic complications: no      Cardiovascular status: stable  Respiratory status: spontaneous ventilation and face mask  Hydration status: euvolemic  Follow-up not needed.              Vitals Value Taken Time   /64 04/25/24 1416   Temp 36.4 °C (97.5 °F) 04/25/24 1224   Pulse 80 04/25/24 1421   Resp 14 04/25/24 1400   SpO2 95 % 04/25/24 1421   Vitals shown include unfiled device data.      Event Time   Out of Recovery 12:45:00         Pain/Adrienne Score: Pain Rating Prior to Med Admin: 8 (4/25/2024  1:58 PM)  Adrienne Score: 9 (4/25/2024 12:45 PM)

## 2024-04-25 NOTE — PROGRESS NOTES
Discharge instructions reviewed w/pt and family via video , verbalized understanding. Pt in NADN.States pain tolerable, denies nausea at this time. Tolerated liquids w/ no issues. To be /d'cd home w/ family.

## 2024-04-29 NOTE — OP NOTE
Date of service: 04/25/24    Pre-operative Diagnosis:   Left thyroid nodule with FNA showing follicular lesion of undetermined significance    Post-operative Diagnosis:  Same    Procedures Performed:  Left hemithyroidectomy    Surgeon: Weston Calvillo MD    Assistant(s):  Miri Matos MD    Anesthesia: General Endotracheal    EBL:  20 cc    Specimens:  LOBECTOMY, THYROID Number of specimens: 2Name of specimens: 1. DELPHIAN LYMPH NODE2. LEFT THYROID LOBE, SHORT STITCH - SUPERIOR, LONG STITCH, LATERAL - PERMANENT     Findings:  Solitary nodule in left mid lobe removed along with the left lobe. Recurrent laryngeal nerve was identified and preserved and stimulated at 0.5 milliampere of stimulus on the nerve   integrity monitor system to 430 microvolts.  Left superior and inferior parathyroid glands were identified and preserved.     Indications for Procedure:  Ms. Hidalgo is a 53-year-old female seen in my clinic with thyroid nodule with FNA showing flus.  She had had previous right-sided surgery on her thyroid in [place] with cyst excised    Procedure in Detail:  After informed consent was obtained in holding area with risks and benefits reviewed patient was taken back to the OR and intubated transorally with the nerve monitoring endotracheal tube and an adequate level of general endotracheal anesthesia was achieved. Adequate functioning of the nerve monitoring tube was determined by positive responses when the patient became light with anesthesia, as there was evidence of neuromuscular activity, and with direct palpation of the thyroid ala, which gave an appropriate stimulus response on the side of palpation. The anterior neck and chest were then prepped and draped in the standard fashion. A timeout was performed according to the Universal protocol. A suitable skin crease located about 2 fingerbreadths above the clavicle was then marked and injected with 1% lidocaine with 1:100,000 epinephrine.     A 4 cm incision was then  made within the skin crease through the skin and subcutaneous tissues. The platysma was divided laterally and the superficial layer of   cervical fascia divided in the midline. Subplatysmal flaps were elevated to the thyroid notch superiorly and to the sternal notch inferiorly. The median raphe between the strap muscles was identified and . The sternohyoid was then elevated off the left thyroid lobe.  The sternothyroid was divided at its midpoint over the center of the gland and  from superior and inferior pole.  The superior pole was identified. The cricothyroid space was developed. The superior pedicle was then ligated with hemoclips and divided close to the capsule of the gland in order to protect superior laryngeal nerve. The gland was rotated medially. Multiple fibrovascular attachments of the gland in the region of the middle thyroid vein were then either clipped or controlled with a bipolar. The gland was rotated even further medially. The tubercle of Zuckerkandl was identified, and the superior parathyroid gland was identified and swept laterally. The inferior parathyroid gland was then identified located superficial to the recurrent nerve and also swept laterally. The recurrent nerve was identified coursing obliquely through the Left paratracheal gutter, just deep to the tubercle, and it was dissected both distally and proximally towards the cricothyroid joint, dividing the intervening fibrovascular attachments of the gland to the central neck.  Green's ligament was then freed with bipolar cautery from tracheal wall and isthmus was divided with cautery. Left thyroid lobe was then sent to pathology with short stitch marking superior and long stitch marking lateral.    The wound was copiously irrigated with saline and additional hemostasis was achieved as necessary with focal application of the bipolar cautery. Some Surgicel was placed in the paratracheal gutters. Multiple Valsalva maneuvers  were performed with no further bleeding. The fascia of the strap muscles was reapproximated in the midline using running locking 3-0 Vicryl.  The wound was then closed in layers with platysma closed with deep 3-0 Vicryl suture.  Subdermal layer was closed with 3-0 Vicryl suture as well and skin was closed with running locking 5 0 fast-absorbing gut suture.  Mastisol and Steri-Strips were applied.  Patient was turned over to Anesthesia awakened and taken to recovery in stable condition.    All sponge, needle and instrument counts were correct at the end of case x2.     I was present for and performed all portions of this operation.     Complications:  None    Attestation:  I was present for the entire procedure    DISCLAIMER: This note was prepared with [employer] voice recognition transcription software. Garbled syntax, mangled pronouns, and other bizarre constructions may be attributed to that software system. While efforts were made to correct any mistakes made by this voice recognition program, some errors and/or omissions may remain in the note that were missed when the note was originally created.

## 2024-05-01 LAB
FINAL PATHOLOGIC DIAGNOSIS: NORMAL
GROSS: NORMAL
Lab: NORMAL

## 2024-05-06 ENCOUNTER — OFFICE VISIT (OUTPATIENT)
Dept: OTOLARYNGOLOGY | Facility: CLINIC | Age: 54
End: 2024-05-06
Payer: COMMERCIAL

## 2024-05-06 VITALS
DIASTOLIC BLOOD PRESSURE: 80 MMHG | SYSTOLIC BLOOD PRESSURE: 114 MMHG | WEIGHT: 164.69 LBS | HEART RATE: 73 BPM | BODY MASS INDEX: 28.27 KG/M2

## 2024-05-06 DIAGNOSIS — Z09 POSTOP CHECK: Primary | ICD-10-CM

## 2024-05-06 PROCEDURE — 99024 POSTOP FOLLOW-UP VISIT: CPT | Mod: S$GLB,,, | Performed by: OTOLARYNGOLOGY

## 2024-05-06 PROCEDURE — 3044F HG A1C LEVEL LT 7.0%: CPT | Mod: CPTII,S$GLB,, | Performed by: OTOLARYNGOLOGY

## 2024-05-06 PROCEDURE — 1160F RVW MEDS BY RX/DR IN RCRD: CPT | Mod: CPTII,S$GLB,, | Performed by: OTOLARYNGOLOGY

## 2024-05-06 PROCEDURE — 3074F SYST BP LT 130 MM HG: CPT | Mod: CPTII,S$GLB,, | Performed by: OTOLARYNGOLOGY

## 2024-05-06 PROCEDURE — 99999 PR PBB SHADOW E&M-EST. PATIENT-LVL III: CPT | Mod: PBBFAC,,, | Performed by: OTOLARYNGOLOGY

## 2024-05-06 PROCEDURE — 1159F MED LIST DOCD IN RCRD: CPT | Mod: CPTII,S$GLB,, | Performed by: OTOLARYNGOLOGY

## 2024-05-06 PROCEDURE — 3079F DIAST BP 80-89 MM HG: CPT | Mod: CPTII,S$GLB,, | Performed by: OTOLARYNGOLOGY

## 2024-05-06 NOTE — PROGRESS NOTES
Subjective: 53 y.o. female seen in follow up s/p hemithyroidectomy 04/25/2024 for benign adenomatous nodule.  She returns today for postop check.  She took Steri-Strips off yesterday.  She reports some recurrent pain that is somewhat sharp that started yesterday but overall obtain has decreased.  She denies any redness or drainage from the incision.  Of note the interview was done via  [facility] ID number [id number]    Objective:  /80 (BP Location: Left arm, Patient Position: Sitting, BP Method: Large (Automatic))   Pulse 73   Wt 74.7 kg (164 lb 10.9 oz)   BMI 28.27 kg/m²     Exam:  General No acute distress  Incision clean dry intact with fast-absorbing place with expected induration.    Pathology  1. Lymph node, Delphian, excision:   - One lymph node, negative for malignancy (0/1)     2. Thyroid, left lobe, hemithyroidectomy:   - Adenomatoid nodule with changes consistent with previous aspiration site     Assessment / Plan:  I discussed continued wound care with Vaseline for the next week or so.  I recommended silicone tape and Mederma starting in a couple weeks.  I discussed pathology with no need for completion or further observation for the thyroid.  Follow up p.r.n.

## 2024-05-23 DIAGNOSIS — M62.838 CERVICAL PARASPINAL MUSCLE SPASM: ICD-10-CM

## 2024-05-23 RX ORDER — TIZANIDINE 4 MG/1
4 TABLET ORAL NIGHTLY PRN
Qty: 90 TABLET | Refills: 1 | Status: SHIPPED | OUTPATIENT
Start: 2024-05-23

## 2024-05-23 NOTE — TELEPHONE ENCOUNTER
No care due was identified.  [facility] Embedded Care Due Messages. Reference number: [id number].   5/23/2024 12:11:29 AM CDT

## 2024-06-18 ENCOUNTER — OFFICE VISIT (OUTPATIENT)
Dept: OBSTETRICS AND GYNECOLOGY | Facility: CLINIC | Age: 54
End: 2024-06-18
Payer: COMMERCIAL

## 2024-06-18 VITALS
DIASTOLIC BLOOD PRESSURE: 80 MMHG | SYSTOLIC BLOOD PRESSURE: 126 MMHG | BODY MASS INDEX: 29.25 KG/M2 | WEIGHT: 170.38 LBS

## 2024-06-18 DIAGNOSIS — N95.1 PERIMENOPAUSAL: ICD-10-CM

## 2024-06-18 DIAGNOSIS — Z12.4 PAP SMEAR FOR CERVICAL CANCER SCREENING: ICD-10-CM

## 2024-06-18 DIAGNOSIS — D22.9 CHANGE IN MOLE: ICD-10-CM

## 2024-06-18 DIAGNOSIS — Z01.419 ENCOUNTER FOR ANNUAL ROUTINE GYNECOLOGICAL EXAMINATION: Primary | ICD-10-CM

## 2024-06-18 PROCEDURE — 3079F DIAST BP 80-89 MM HG: CPT | Mod: CPTII,S$GLB,, | Performed by: OBSTETRICS & GYNECOLOGY

## 2024-06-18 PROCEDURE — 3008F BODY MASS INDEX DOCD: CPT | Mod: CPTII,S$GLB,, | Performed by: OBSTETRICS & GYNECOLOGY

## 2024-06-18 PROCEDURE — 99999 PR PBB SHADOW E&M-EST. PATIENT-LVL III: CPT | Mod: PBBFAC,,, | Performed by: OBSTETRICS & GYNECOLOGY

## 2024-06-18 PROCEDURE — 87624 HPV HI-RISK TYP POOLED RSLT: CPT | Performed by: OBSTETRICS & GYNECOLOGY

## 2024-06-18 PROCEDURE — 1159F MED LIST DOCD IN RCRD: CPT | Mod: CPTII,S$GLB,, | Performed by: OBSTETRICS & GYNECOLOGY

## 2024-06-18 PROCEDURE — 3074F SYST BP LT 130 MM HG: CPT | Mod: CPTII,S$GLB,, | Performed by: OBSTETRICS & GYNECOLOGY

## 2024-06-18 PROCEDURE — 99396 PREV VISIT EST AGE 40-64: CPT | Mod: S$GLB,,, | Performed by: OBSTETRICS & GYNECOLOGY

## 2024-06-18 PROCEDURE — 3044F HG A1C LEVEL LT 7.0%: CPT | Mod: CPTII,S$GLB,, | Performed by: OBSTETRICS & GYNECOLOGY

## 2024-07-03 ENCOUNTER — TELEPHONE (OUTPATIENT)
Dept: FAMILY MEDICINE | Facility: CLINIC | Age: 54
End: 2024-07-03
Payer: COMMERCIAL

## 2024-07-03 NOTE — TELEPHONE ENCOUNTER
----- Message from Barbra Simeon sent at 7/2/2024  2:53 PM CDT -----  Needs advice from nurse:      Who Called:daughter-Brea  Regarding:states pharmacy recommended patient takes the Hep B vaccine and she would like to know if it is needed or if she has had this vaccine already  Would the patient rather a call back or [facility]IA [facility]?  Best Call Back number:[phone]  Additional Info:

## 2024-07-03 NOTE — TELEPHONE ENCOUNTER
Called and spoke with pt's daughter in regards of message on her mother. Pt's daughter called in regards of her mother wanting to know if she needs to get the Hep B vaccine done, and if it is really necessary for her to get. Pt's daughter states that her mother informed her that she did have vaccine in the past. Please advise pt message.

## 2024-07-03 NOTE — TELEPHONE ENCOUNTER
Spoke with pt's daughter in regards of Dr. Keen's message. Pt's daughter verbalized understanding of message.

## 2024-07-05 ENCOUNTER — TELEPHONE (OUTPATIENT)
Dept: OBSTETRICS AND GYNECOLOGY | Facility: CLINIC | Age: 54
End: 2024-07-05
Payer: COMMERCIAL

## 2024-07-05 NOTE — TELEPHONE ENCOUNTER
Called patient to discuss ASCUS/HPV- pap smear; recommend repeat in 1 year. No answer so voicemail left, [employer] message sent. Will try to call again later.     [phone] language line number used for call

## 2024-07-11 ENCOUNTER — TELEPHONE (OUTPATIENT)
Dept: OBSTETRICS AND GYNECOLOGY | Facility: CLINIC | Age: 54
End: 2024-07-11
Payer: COMMERCIAL

## 2024-07-11 NOTE — TELEPHONE ENCOUNTER
Amber    Called to review test results. No answer so voice mail left. This is the second one so [facility] message sent.

## 2024-07-12 ENCOUNTER — TELEPHONE (OUTPATIENT)
Dept: OBSTETRICS AND GYNECOLOGY | Facility: CLINIC | Age: 54
End: 2024-07-12
Payer: COMMERCIAL

## 2024-07-12 NOTE — TELEPHONE ENCOUNTER
Called again with      Patient was not home. So got her cell phone and called that.     Reviewed pap smear results, recommend repeat in 1 year. She expressed her understanding.  Appt set up for 1 year.

## 2024-08-26 ENCOUNTER — OFFICE VISIT (OUTPATIENT)
Dept: FAMILY MEDICINE | Facility: CLINIC | Age: 54
End: 2024-08-26
Payer: COMMERCIAL

## 2024-08-26 VITALS
SYSTOLIC BLOOD PRESSURE: 124 MMHG | HEART RATE: 68 BPM | TEMPERATURE: 98 F | BODY MASS INDEX: 28.47 KG/M2 | HEIGHT: 64 IN | DIASTOLIC BLOOD PRESSURE: 82 MMHG | WEIGHT: 166.75 LBS | OXYGEN SATURATION: 98 %

## 2024-08-26 DIAGNOSIS — M79.671 PAIN OF RIGHT HEEL: ICD-10-CM

## 2024-08-26 DIAGNOSIS — Z86.39 HISTORY OF IRON DEFICIENCY: Primary | ICD-10-CM

## 2024-08-26 DIAGNOSIS — R73.01 ELEVATED FASTING GLUCOSE: ICD-10-CM

## 2024-08-26 DIAGNOSIS — Z90.09 HISTORY OF LOBECTOMY OF THYROID: ICD-10-CM

## 2024-08-26 PROBLEM — E07.89 THYROID MASS OF UNCLEAR ETIOLOGY: Status: RESOLVED | Noted: 2024-04-25 | Resolved: 2024-08-26

## 2024-08-26 PROCEDURE — 3074F SYST BP LT 130 MM HG: CPT | Mod: CPTII,S$GLB,, | Performed by: STUDENT IN AN ORGANIZED HEALTH CARE EDUCATION/TRAINING PROGRAM

## 2024-08-26 PROCEDURE — 1159F MED LIST DOCD IN RCRD: CPT | Mod: CPTII,S$GLB,, | Performed by: STUDENT IN AN ORGANIZED HEALTH CARE EDUCATION/TRAINING PROGRAM

## 2024-08-26 PROCEDURE — 3079F DIAST BP 80-89 MM HG: CPT | Mod: CPTII,S$GLB,, | Performed by: STUDENT IN AN ORGANIZED HEALTH CARE EDUCATION/TRAINING PROGRAM

## 2024-08-26 PROCEDURE — 3044F HG A1C LEVEL LT 7.0%: CPT | Mod: CPTII,S$GLB,, | Performed by: STUDENT IN AN ORGANIZED HEALTH CARE EDUCATION/TRAINING PROGRAM

## 2024-08-26 PROCEDURE — 1160F RVW MEDS BY RX/DR IN RCRD: CPT | Mod: CPTII,S$GLB,, | Performed by: STUDENT IN AN ORGANIZED HEALTH CARE EDUCATION/TRAINING PROGRAM

## 2024-08-26 PROCEDURE — 99214 OFFICE O/P EST MOD 30 MIN: CPT | Mod: S$GLB,,, | Performed by: STUDENT IN AN ORGANIZED HEALTH CARE EDUCATION/TRAINING PROGRAM

## 2024-08-26 PROCEDURE — 99999 PR PBB SHADOW E&M-EST. PATIENT-LVL IV: CPT | Mod: PBBFAC,,, | Performed by: STUDENT IN AN ORGANIZED HEALTH CARE EDUCATION/TRAINING PROGRAM

## 2024-08-26 PROCEDURE — 3008F BODY MASS INDEX DOCD: CPT | Mod: CPTII,S$GLB,, | Performed by: STUDENT IN AN ORGANIZED HEALTH CARE EDUCATION/TRAINING PROGRAM

## 2024-08-26 NOTE — PROGRESS NOTES
Subjective:       Patient ID: Madelin Hidalgo is a 53 y.o. female.    Chief Complaint: Follow-up (Pt here for a 6 month follow up )    C/O right heel pain; difficult to walk at times which limits work; she also has complaint of bloating and gas in stomach ongoing   Weight gain since thyroid surgery; has not changed diet or exercise routine, needs labs    Review of Systems   Constitutional:  Positive for unexpected weight change.   Gastrointestinal:  Positive for abdominal distention and constipation. Negative for diarrhea and nausea.   Musculoskeletal:  Positive for arthralgias.   All other systems reviewed and are negative.       A1C:  Recent Labs   Lab 11/03/21  0945 03/21/23  0943 02/27/24  0858   Hemoglobin A1C 5.9 H 5.8 H 5.3     CBC:  Recent Labs   Lab 03/21/23  0943 02/15/24  1619 02/27/24  0858   WBC 9.47 7.54 6.79   RBC 4.19 3.25 L 3.56 L   Hemoglobin 12.0 9.6 L 9.8 L   Hematocrit 37.6 29.2 L 31.4 L   Platelets 283 448 479 H   MCV 90 90 88   MCH 28.6 29.5 27.5   MCHC 31.9 L 32.9 31.2 L     CMP:  Recent Labs   Lab 03/05/23  0240 03/21/23  0943 02/15/24  1619 02/27/24  0858   Glucose 121 H 104   < > 111 H   Calcium 9.2 8.5 L   < > 8.7   Albumin 3.8 4.3  --  4.2   Total Protein 7.7 7.6  --  7.7   Sodium 143 140   < > 147 H   Potassium 3.7 3.9   < > 3.9   CO2 24 29   < > 27   Chloride 107 106   < > 109   BUN 10 13   < > 13   Creatinine 0.7 0.45 L   < > 0.57   Alkaline Phosphatase 81 81  --  91   ALT 27 23  --  35   AST 23 25  --  31   Total Bilirubin 0.4 0.4  --  0.4    < > = values in this interval not displayed.     LIPIDS:  Recent Labs   Lab 11/03/21  0945 03/21/23  0943 02/15/24  1619 02/27/24  0858   TSH 3.452 2.270   < > 1.810   HDL 53 49  --  49   Cholesterol 203 H 194  --  201 H   Triglycerides 178 H 249 H  --  215 H   LDL Cholesterol 114.4 95.2  --  109.0   HDL/Cholesterol Ratio 26.1 25.3  --  24.4   Non-HDL Cholesterol 150 145  --  152   Total Cholesterol/HDL Ratio 3.8 4.0  --  4.1    < > = values in  this interval not displayed.     TSH:  Recent Labs   Lab 03/21/23  0943 02/15/24  1619 02/27/24  0858   TSH 2.270 2.421 1.810        Objective:      Vitals:    08/26/24 0826   BP: 124/82   Pulse: 68   Temp: 98.1 °F (36.7 °C)      Physical Exam  Vitals reviewed.   Constitutional:       Appearance: Normal appearance. She is overweight.   HENT:      Head: Normocephalic and atraumatic.   Eyes:      Conjunctiva/sclera: Conjunctivae normal.   Cardiovascular:      Rate and Rhythm: Normal rate and regular rhythm.      Heart sounds: Normal heart sounds.   Pulmonary:      Effort: Pulmonary effort is normal.      Breath sounds: Normal breath sounds.   Abdominal:      Palpations: Abdomen is soft.      Tenderness: There is no abdominal tenderness.   Musculoskeletal:         General: Normal range of motion.      Cervical back: Normal range of motion.      Right lower leg: No edema.      Left lower leg: No edema.   Neurological:      Mental Status: She is alert. Mental status is at baseline.   Psychiatric:         Mood and Affect: Mood normal.         Behavior: Behavior normal.          Assessment:       1. History of iron deficiency    2. History of lobectomy of thyroid    3. Pain of right heel    4. Elevated fasting glucose        Plan:     Problem List Items Addressed This Visit          ENT    History of lobectomy of thyroid    Overview     S/p left lobectomy  Notes right side nodule removal in past  She notes + fatigue, stomach issues, weight gain since having thyroid partially removed   Will check labs today               Oncology    History of iron deficiency - Primary    Overview     S/p infusion  Due for labs  Symptoms improved                 Endocrine    Elevated fasting glucose    Overview     Diet controlled          Other Visit Diagnoses       Pain of right heel        Relevant Orders    Ambulatory referral/consult to Podiatry          Labs per orders   Problem list reviewed in detail   Continue healthy lifestyle  efforts  Continue current meds as prescribed otherwise; refills per request  Keep routine specialist f/u   RTC in 6 months  with labs prior for annual and/or PRN          [facility]   8/26/24

## 2024-08-28 DIAGNOSIS — R29.898 DIFFICULTY IN WEIGHT BEARING: Primary | ICD-10-CM

## 2024-08-28 DIAGNOSIS — R52 PAIN: ICD-10-CM

## 2024-08-29 ENCOUNTER — OFFICE VISIT (OUTPATIENT)
Dept: PODIATRY | Facility: CLINIC | Age: 54
End: 2024-08-29
Payer: COMMERCIAL

## 2024-08-29 VITALS
HEIGHT: 64 IN | DIASTOLIC BLOOD PRESSURE: 87 MMHG | HEART RATE: 79 BPM | SYSTOLIC BLOOD PRESSURE: 130 MMHG | WEIGHT: 166.88 LBS | BODY MASS INDEX: 28.49 KG/M2

## 2024-08-29 DIAGNOSIS — M79.671 PAIN OF RIGHT HEEL: ICD-10-CM

## 2024-08-29 DIAGNOSIS — M72.2 PLANTAR FASCIITIS: Primary | ICD-10-CM

## 2024-08-29 PROCEDURE — 99999 PR PBB SHADOW E&M-EST. PATIENT-LVL III: CPT | Mod: PBBFAC,,, | Performed by: PODIATRIST

## 2024-08-29 RX ORDER — MELOXICAM 15 MG/1
15 TABLET ORAL DAILY PRN
Qty: 20 TABLET | Refills: 0 | Status: SHIPPED | OUTPATIENT
Start: 2024-08-29 | End: 2024-09-18

## 2024-08-29 RX ORDER — TRIAMCINOLONE ACETONIDE 40 MG/ML
40 INJECTION, SUSPENSION INTRA-ARTICULAR; INTRAMUSCULAR ONCE
Status: COMPLETED | OUTPATIENT
Start: 2024-08-29 | End: 2024-08-29

## 2024-08-29 RX ORDER — AZITHROMYCIN 250 MG/1
TABLET, FILM COATED ORAL
COMMUNITY
Start: 2024-05-14

## 2024-08-29 RX ORDER — DEXAMETHASONE SODIUM PHOSPHATE 4 MG/ML
4 INJECTION, SOLUTION INTRA-ARTICULAR; INTRALESIONAL; INTRAMUSCULAR; INTRAVENOUS; SOFT TISSUE ONCE
Status: COMPLETED | OUTPATIENT
Start: 2024-08-29 | End: 2024-08-29

## 2024-08-29 RX ORDER — HEPATITIS B VACCINE (RECOMBINANT) ADJUVANTED 20 UG/.5ML
INJECTION, SOLUTION INTRAMUSCULAR
COMMUNITY
Start: 2024-07-09

## 2024-08-29 RX ADMIN — DEXAMETHASONE SODIUM PHOSPHATE 4 MG: 4 INJECTION, SOLUTION INTRA-ARTICULAR; INTRALESIONAL; INTRAMUSCULAR; INTRAVENOUS; SOFT TISSUE at 01:08

## 2024-08-29 RX ADMIN — TRIAMCINOLONE ACETONIDE 40 MG: 40 INJECTION, SUSPENSION INTRA-ARTICULAR; INTRAMUSCULAR at 01:08

## 2024-08-29 NOTE — PROGRESS NOTES
PODIATRY    Patient ID:  Madelin Hidalgo is a 53 y.o. female     Chief Complaint   Patient presents with    Heel Pain     Pain of right heel         MEDICAL DECISION MAKING      Problem List Items Addressed This Visit    None  Visit Diagnoses       Plantar fasciitis    -  Primary    Relevant Medications    meloxicam (MOBIC) 15 MG tablet    dexAMETHasone injection 4 mg (Completed) (Start on 8/29/2024  2:45 PM)    triamcinolone acetonide injection 40 mg (Completed)    Pain of right heel        Relevant Medications    meloxicam (MOBIC) 15 MG tablet    dexAMETHasone injection 4 mg (Completed) (Start on 8/29/2024  2:45 PM)    triamcinolone acetonide injection 40 mg (Completed)            I counseled the patient on the patient's conditions, their implications and medical management.     I ordered xrays and reviewed the xrays with the patient.     The patient was provided  literature regarding plantar fasciitis and stretching.  We also discussed proper shoe gear.  Spenco orthotic supports were recommended.   Avoid wearing flats, slippers, sandals, and going barefoot.     Patient is interested in a cortisone injection today.  With the patient's permission, an injection of total of 2ccs of mixture of 0.5cc of Kenalog-40, 0.5cc of dexamethasone phosphate 4mg and 0.5cc of plain lidocaine 1% and 0.5cc of plain marcaine 0.5% into the RIGHT  heel, medial approach. Patient tolerated well.     Prescription as above     Call or return to clinic prn if these symptoms worsen or fail to improve as anticipated.           HPI:   Madelin Hidalgo is a 53 y.o. female who presents to clinic with concerns of bilateral (RIGHT > LEFT ) heel pain for about a month.     Pain is worse with weight bearing and first few steps after prolonged periods of rest.     Patient denies acute trauma to the affected area.   Treatment tried: stretching, advil.  Both helped initially but not anymore  She has a raised skin lesion medial RIGHT heel, present at least 12  "years.    Has appointment with dermatology for evaluation       Patient Active Problem List   Diagnosis    History of lobectomy of thyroid    History of iron deficiency    Vitamin D deficiency    Elevated fasting glucose    Iron deficiency anemia due to chronic blood loss         Current Outpatient Medications on File Prior to Visit   Medication Sig Dispense Refill    ascorbic acid, vitamin C, (VITAMIN C) 100 MG tablet Take 100 mg by mouth once daily.      azithromycin (Z-LORRAINE) 250 MG tablet Take by mouth.      HEPLISAV-B, PF, 20 mcg/0.5 mL Syrg        No current facility-administered medications on file prior to visit.           Review of patient's allergies indicates:  No Known Allergies      ROS:  General ROS: negative for  chills, fatigue or fever  Cardiovascular ROS: no chest pain or dyspnea on exertion  Musculoskeletal ROS: negative for joint pain or joint stiffness.  Negative for loss of strength.  Positive for foot pain.   Neuro ROS: Negative for syncope, numbness, or muscle weakness  Skin ROS: Negative for rash, itching or nail/hair changes.         OBJECTIVE:     Vitals:    08/29/24 1300   BP: 130/87   Pulse: 79   Weight: 75.7 kg (166 lb 14.2 oz)   Height: 5' 4" (1.626 m)        Lower extremity exam:  Vasc:   Palpable pedal pulses.   Feet appropriately warm to touch.   Cap refill time is within normal limits   Edema:  Trace    Neurological:    Light touch, proprioception, and Sharp/dull sensation are all intact.   There is no Tinel's along the tarsal tunnel.    Mulders click:   absent  Reflexes:   2+    Derm:   No open lesions, macerations, or rashes  Bruising:  absent  Redness:  absent  Pedal hair:  present    MSK:    Palpable pain plantar medial tubercle of the calcaneus of affected foot  Palpable pain medial band of plantar fascia of the affected foot   Foot arch type:  Neutral  tightness to the Achilles tendon with ROM of affected foot.   There is no pain with the lateral heel squeeze/compression  test. "       8/29/2024  bilateral Foot Xray Imaging: Impression:    Normal bilateral feet WITHOUT evidence for fracture, foreign body nor degenerative joint disease

## 2024-09-06 ENCOUNTER — TELEPHONE (OUTPATIENT)
Dept: FAMILY MEDICINE | Facility: CLINIC | Age: 54
End: 2024-09-06
Payer: COMMERCIAL

## 2024-09-06 NOTE — TELEPHONE ENCOUNTER
----- Message from Breanna Keen DO sent at 9/5/2024  4:16 PM CDT -----  Repeat TSH, T3 and T4 in 6 weeks; please schedule

## 2024-10-14 ENCOUNTER — OFFICE VISIT (OUTPATIENT)
Dept: DERMATOLOGY | Facility: CLINIC | Age: 54
End: 2024-10-14
Payer: COMMERCIAL

## 2024-10-14 DIAGNOSIS — D48.5 NEOPLASM OF UNCERTAIN BEHAVIOR OF SKIN: Primary | ICD-10-CM

## 2024-10-14 PROCEDURE — 88305 TISSUE EXAM BY PATHOLOGIST: CPT | Mod: 26,,, | Performed by: PATHOLOGY

## 2024-10-14 PROCEDURE — 11104 PUNCH BX SKIN SINGLE LESION: CPT | Mod: S$GLB,,, | Performed by: STUDENT IN AN ORGANIZED HEALTH CARE EDUCATION/TRAINING PROGRAM

## 2024-10-14 PROCEDURE — 99499 UNLISTED E&M SERVICE: CPT | Mod: S$GLB,,, | Performed by: STUDENT IN AN ORGANIZED HEALTH CARE EDUCATION/TRAINING PROGRAM

## 2024-10-14 PROCEDURE — 99999 PR PBB SHADOW E&M-EST. PATIENT-LVL III: CPT | Mod: PBBFAC,,, | Performed by: STUDENT IN AN ORGANIZED HEALTH CARE EDUCATION/TRAINING PROGRAM

## 2024-10-14 PROCEDURE — 88305 TISSUE EXAM BY PATHOLOGIST: CPT | Performed by: PATHOLOGY

## 2024-10-14 NOTE — PATIENT INSTRUCTIONS

## 2024-10-14 NOTE — PROGRESS NOTES
Subjective:      Patient ID:  Madelin Hidalgo is a 53 y.o. female who presents for   Chief Complaint   Patient presents with    Mole     Pt here for mole check- pt requested daughter serve as     Pt denies h/o skin cancer     Pt concerned about dark mole on R foot that has been present for over 10 years. Pt reports it blisters and causes pain.     Mole      Mole on right medial foot. She believes it has been there for about 10 years but feels it has gotten slightly larger over the last few years    Present with daughter who is translating    Review of Systems    Objective:   Physical Exam   Constitutional: She appears well-developed and well-nourished.   Neurological: She is alert and oriented to person, place, and time.   Psychiatric: She has a normal mood and affect.   Skin:   Areas Examined (abnormalities noted in diagram):   Nails and Digits Inspection Performed           Diagram Legend     Erythematous scaling macule/papule c/w actinic keratosis       Vascular papule c/w angioma      Pigmented verrucoid papule/plaque c/w seborrheic keratosis      Yellow umbilicated papule c/w sebaceous hyperplasia      Irregularly shaped tan macule c/w lentigo     1-2 mm smooth white papules consistent with Milia      Movable subcutaneous cyst with punctum c/w epidermal inclusion cyst      Subcutaneous movable cyst c/w pilar cyst      Firm pink to brown papule c/w dermatofibroma      Pedunculated fleshy papule(s) c/w skin tag(s)      Evenly pigmented macule c/w junctional nevus     Mildly variegated pigmented, slightly irregular-bordered macule c/w mildly atypical nevus      Flesh colored to evenly pigmented papule c/w intradermal nevus       Pink pearly papule/plaque c/w basal cell carcinoma      Erythematous hyperkeratotic cursted plaque c/w SCC      Surgical scar with no sign of skin cancer recurrence      Open and closed comedones      Inflammatory papules and pustules      Verrucoid papule consistent consistent  with wart     Erythematous eczematous patches and plaques     Dystrophic onycholytic nail with subungual debris c/w onychomycosis     Umbilicated papule    Erythematous-base heme-crusted tan verrucoid plaque consistent with inflamed seborrheic keratosis     Erythematous Silvery Scaling Plaque c/w Psoriasis     See annotation            Assessment / Plan:      Pathology Orders:       Normal Orders This Visit    Specimen to Pathology, Dermatology     Questions:    Procedure Type: Dermatology and skin neoplasms    Number of Specimens: 1    ------------------------: -------------------------    Spec 1 Procedure: Biopsy    Spec 1 Clinical Impression: IDN, melanoma, verruca    Spec 1 Source: right medial foot    Release to patient: Immediate    Release to patient:           Neoplasm of uncertain behavior of skin  -     Ambulatory referral/consult to Dermatology  -     Specimen to Pathology, Dermatology    Punch biopsy procedure note:  Punch biopsy performed after verbal consent obtained. Area marked and prepped with alcohol. Approximately 1cc of 1% lidocaine with epinephrine injected. 8 mm disposable punch used to remove lesion. Hemostasis obtained and biopsy site closed with 1 - 2 Prolene sutures. Wound care instructions reviewed with patient and handout given.           Follow up in about 2 weeks (around 10/28/2024) for Suture removal.

## 2024-10-16 LAB
FINAL PATHOLOGIC DIAGNOSIS: NORMAL
GROSS: NORMAL
Lab: NORMAL
MICROSCOPIC EXAM: NORMAL

## 2024-10-22 ENCOUNTER — PATIENT MESSAGE (OUTPATIENT)
Dept: RESEARCH | Facility: HOSPITAL | Age: 54
End: 2024-10-22
Payer: COMMERCIAL

## 2024-10-22 ENCOUNTER — TELEPHONE (OUTPATIENT)
Dept: FAMILY MEDICINE | Facility: CLINIC | Age: 54
End: 2024-10-22
Payer: COMMERCIAL

## 2024-10-22 DIAGNOSIS — E03.8 SUBCLINICAL HYPOTHYROIDISM: Primary | ICD-10-CM

## 2024-10-22 RX ORDER — LEVOTHYROXINE SODIUM 25 UG/1
25 TABLET ORAL
Qty: 30 TABLET | Refills: 1 | Status: SHIPPED | OUTPATIENT
Start: 2024-10-22

## 2024-10-22 NOTE — TELEPHONE ENCOUNTER
Levothyroxine 25 mcg PO daily started 2/2 subclinical hypothyroidism levels and patient coming of increased weight gain/ fatigue post partial thyroid lobectomy 4/2024. Repeat labs in 4 weeks.

## 2024-10-28 ENCOUNTER — CLINICAL SUPPORT (OUTPATIENT)
Dept: DERMATOLOGY | Facility: CLINIC | Age: 54
End: 2024-10-28
Payer: COMMERCIAL

## 2024-10-28 DIAGNOSIS — T14.8XXD WOUND HEALING, DELAYED: Primary | ICD-10-CM

## 2024-10-28 DIAGNOSIS — Z48.02 VISIT FOR SUTURE REMOVAL: Primary | ICD-10-CM

## 2024-10-28 PROCEDURE — 99024 POSTOP FOLLOW-UP VISIT: CPT | Mod: S$GLB,,, | Performed by: STUDENT IN AN ORGANIZED HEALTH CARE EDUCATION/TRAINING PROGRAM

## 2024-10-28 RX ORDER — MUPIROCIN 20 MG/G
OINTMENT TOPICAL 3 TIMES DAILY
Qty: 30 G | Refills: 1 | Status: SHIPPED | OUTPATIENT
Start: 2024-10-28

## 2024-12-20 DIAGNOSIS — E03.8 SUBCLINICAL HYPOTHYROIDISM: ICD-10-CM

## 2024-12-20 RX ORDER — LEVOTHYROXINE SODIUM 25 UG/1
25 TABLET ORAL
Qty: 30 TABLET | Refills: 2 | Status: SHIPPED | OUTPATIENT
Start: 2024-12-20

## 2025-02-24 ENCOUNTER — RESULTS FOLLOW-UP (OUTPATIENT)
Dept: FAMILY MEDICINE | Facility: CLINIC | Age: 55
End: 2025-02-24

## 2025-02-27 ENCOUNTER — OFFICE VISIT (OUTPATIENT)
Dept: FAMILY MEDICINE | Facility: CLINIC | Age: 55
End: 2025-02-27
Payer: COMMERCIAL

## 2025-02-27 VITALS
OXYGEN SATURATION: 98 % | HEIGHT: 64 IN | DIASTOLIC BLOOD PRESSURE: 96 MMHG | HEART RATE: 71 BPM | SYSTOLIC BLOOD PRESSURE: 136 MMHG | TEMPERATURE: 98 F | BODY MASS INDEX: 28.71 KG/M2 | WEIGHT: 168.19 LBS

## 2025-02-27 DIAGNOSIS — E78.2 MODERATE MIXED HYPERLIPIDEMIA NOT REQUIRING STATIN THERAPY: ICD-10-CM

## 2025-02-27 DIAGNOSIS — R03.0 ELEVATED BLOOD PRESSURE READING WITHOUT DIAGNOSIS OF HYPERTENSION: ICD-10-CM

## 2025-02-27 DIAGNOSIS — D50.0 IRON DEFICIENCY ANEMIA DUE TO CHRONIC BLOOD LOSS: ICD-10-CM

## 2025-02-27 DIAGNOSIS — R73.01 ELEVATED FASTING GLUCOSE: ICD-10-CM

## 2025-02-27 DIAGNOSIS — E55.9 VITAMIN D DEFICIENCY: ICD-10-CM

## 2025-02-27 DIAGNOSIS — Z90.09 HISTORY OF LOBECTOMY OF THYROID: ICD-10-CM

## 2025-02-27 DIAGNOSIS — E89.0 POSTOPERATIVE HYPOTHYROIDISM: ICD-10-CM

## 2025-02-27 DIAGNOSIS — Z23 NEED FOR PNEUMOCOCCAL VACCINE: Primary | ICD-10-CM

## 2025-02-27 PROBLEM — Z86.39 HISTORY OF IRON DEFICIENCY: Status: RESOLVED | Noted: 2019-10-11 | Resolved: 2025-02-27

## 2025-02-27 PROCEDURE — 99999 PR PBB SHADOW E&M-EST. PATIENT-LVL III: CPT | Mod: PBBFAC,,, | Performed by: STUDENT IN AN ORGANIZED HEALTH CARE EDUCATION/TRAINING PROGRAM

## 2025-02-27 RX ORDER — LEVOTHYROXINE SODIUM 50 UG/1
50 TABLET ORAL
Qty: 90 TABLET | Refills: 3 | Status: SHIPPED | OUTPATIENT
Start: 2025-02-27

## 2025-02-27 RX ORDER — CETIRIZINE HYDROCHLORIDE 10 MG/1
10 TABLET ORAL DAILY
Qty: 90 TABLET | Refills: 1 | Status: SHIPPED | OUTPATIENT
Start: 2025-02-27 | End: 2026-02-27

## 2025-02-27 NOTE — PROGRESS NOTES
Subjective:       Patient ID: Madelin Hidalgo is a 54 y.o. female.    Chief Complaint: Follow-up (Pt here for a 6 month follow up/)      Review of Systems   Constitutional:  Positive for fatigue.   Allergic/Immunologic: Positive for environmental allergies.   Neurological:  Positive for dizziness.   All other systems reviewed and are negative.       A1C:  Recent Labs   Lab 03/21/23  0943 02/27/24  0858 02/24/25  0900   Hemoglobin A1C 5.8 H 5.3 5.6     CBC:  Recent Labs   Lab 02/27/24  0858 08/26/24  0940 02/24/25  0900   WBC 6.79 7.33 7.23   RBC 3.56 L 4.49 4.44   Hemoglobin 9.8 L 13.2 13.6   Hematocrit 31.4 L 41.1 41.9   Platelets 479 H 310 326   MCV 88 92 94   MCH 27.5 29.4 30.6   MCHC 31.2 L 32.1 32.5     CMP:  Recent Labs   Lab 03/21/23  0943 02/15/24  1619 02/27/24  0858 02/24/25  0900   Glucose 104   < > 111 H 107   Calcium 8.5 L   < > 8.7 9.5   Albumin 4.3  --  4.2 3.8   Total Protein 7.6  --  7.7 8.1   Sodium 140   < > 147 H 143   Potassium 3.9   < > 3.9 4.3   CO2 29   < > 27 26   Chloride 106   < > 109 105   BUN 13   < > 13 18   Creatinine 0.45 L   < > 0.57 0.7   Alkaline Phosphatase 81  --  91 91   ALT 23  --  35 65 H   AST 25  --  31 40   Total Bilirubin 0.4  --  0.4 0.7    < > = values in this interval not displayed.     LIPIDS:  Recent Labs   Lab 03/21/23  0943 02/15/24  1619 02/27/24  0858 08/26/24  0940 02/24/25  0900   TSH 2.270   < > 1.810   < > 5.398 H   HDL 49  --  49  --  55   Cholesterol 194  --  201 H  --  243 H   Triglycerides 249 H  --  215 H  --  258 H   LDL Cholesterol 95.2  --  109.0  --  136.4   HDL/Cholesterol Ratio 25.3  --  24.4  --  22.6   Non-HDL Cholesterol 145  --  152  --  188   Total Cholesterol/HDL Ratio 4.0  --  4.1  --  4.4    < > = values in this interval not displayed.     TSH:  Recent Labs   Lab 08/26/24  0940 10/18/24  0810 02/24/25  0900   TSH 6.327 H 7.984 H 5.398 H        Objective:      Vitals:    02/27/25 0844   BP: (!) 136/96   Pulse: 71   Temp: 97.9 °F (36.6 °C)       Physical Exam  Vitals reviewed.   Constitutional:       Appearance: Normal appearance. She is overweight.   HENT:      Head: Normocephalic and atraumatic.   Eyes:      Conjunctiva/sclera: Conjunctivae normal.   Cardiovascular:      Rate and Rhythm: Normal rate and regular rhythm.      Heart sounds: Normal heart sounds.   Pulmonary:      Effort: Pulmonary effort is normal.      Breath sounds: Normal breath sounds.   Abdominal:      Palpations: Abdomen is soft.      Tenderness: There is no abdominal tenderness.   Musculoskeletal:         General: Normal range of motion.      Cervical back: Normal range of motion.      Right lower leg: No edema.      Left lower leg: No edema.   Neurological:      General: No focal deficit present.      Mental Status: She is alert and oriented to person, place, and time.   Psychiatric:         Mood and Affect: Mood normal.         Behavior: Behavior normal.          Assessment:       1. Need for pneumococcal vaccine    2. Postoperative hypothyroidism    3. History of lobectomy of thyroid    4. Iron deficiency anemia due to chronic blood loss    5. Vitamin D deficiency    6. Elevated fasting glucose    7. Moderate mixed hyperlipidemia not requiring statin therapy    8. Elevated blood pressure reading without diagnosis of hypertension        Plan:     Problem List Items Addressed This Visit          ENT    History of lobectomy of thyroid    Overview   S/p left lobectomy  Notes right side nodule removal in past  + fatigue  TSH elevated   Increase to 50mcg synthroid             Cardiac/Vascular    Moderate mixed hyperlipidemia not requiring statin therapy    Overview   Diet and exercise discussed  Plan to repeat labs in 6 months and may consider medication         Elevated blood pressure reading without diagnosis of hypertension    Overview   Diet and exercise discussed  RTC in 2 weeks for BP check   If remains elevated will need to start medication             Oncology    Iron deficiency  anemia due to chronic blood loss    Overview   stable            Endocrine    Vitamin D deficiency    Overview   Supplements advised          Postoperative hypothyroidism    Overview   Increase synthroid to 50 mcg  + fatigue          Relevant Medications    levothyroxine (SYNTHROID) 50 MCG tablet    Elevated fasting glucose    Overview   Diet controlled          Other Visit Diagnoses         Need for pneumococcal vaccine    -  Primary    Relevant Medications    pneumoc 20-lizbeth conj-dip cr(PF) (PREVNAR-20 (PF)) injection Syrg 0.5 mL (Completed)          Labs reviewed in detail  Problem list reviewed in detail   Increase synthroid to 50  Will consider BP med at f/u and cholesterol med in 6 months following repeat labs  Continue healthy lifestyle efforts  Continue current meds as prescribed otherwise; refills per request  Keep routine specialist f/u   RTC in 2 weeks for BP check with labs prior and/or PRN          [facility]   2/27/25

## 2025-02-28 ENCOUNTER — OFFICE VISIT (OUTPATIENT)
Dept: PODIATRY | Facility: CLINIC | Age: 55
End: 2025-02-28
Payer: COMMERCIAL

## 2025-02-28 DIAGNOSIS — M72.2 PLANTAR FASCIITIS: Primary | ICD-10-CM

## 2025-02-28 DIAGNOSIS — M76.821 POSTERIOR TIBIAL TENDINITIS OF RIGHT LOWER EXTREMITY: ICD-10-CM

## 2025-02-28 PROCEDURE — 1159F MED LIST DOCD IN RCRD: CPT | Mod: CPTII,S$GLB,, | Performed by: STUDENT IN AN ORGANIZED HEALTH CARE EDUCATION/TRAINING PROGRAM

## 2025-02-28 PROCEDURE — 99999 PR PBB SHADOW E&M-EST. PATIENT-LVL III: CPT | Mod: PBBFAC,,, | Performed by: STUDENT IN AN ORGANIZED HEALTH CARE EDUCATION/TRAINING PROGRAM

## 2025-02-28 PROCEDURE — 99214 OFFICE O/P EST MOD 30 MIN: CPT | Mod: S$GLB,,, | Performed by: STUDENT IN AN ORGANIZED HEALTH CARE EDUCATION/TRAINING PROGRAM

## 2025-02-28 PROCEDURE — 3044F HG A1C LEVEL LT 7.0%: CPT | Mod: CPTII,S$GLB,, | Performed by: STUDENT IN AN ORGANIZED HEALTH CARE EDUCATION/TRAINING PROGRAM

## 2025-02-28 RX ORDER — METHYLPREDNISOLONE 4 MG/1
TABLET ORAL
Qty: 1 EACH | Refills: 0 | Status: SHIPPED | OUTPATIENT
Start: 2025-02-28 | End: 2025-03-21

## 2025-02-28 RX ORDER — MELOXICAM 15 MG/1
15 TABLET ORAL DAILY
Qty: 30 TABLET | Refills: 0 | Status: SHIPPED | OUTPATIENT
Start: 2025-02-28

## 2025-02-28 NOTE — PROGRESS NOTES
Subjective:     Patient ID: Madelin Hidalgo is a 54 y.o. female.    Chief Complaint: Foot Problem     Madelin Perez is a 54 y.o. female who presents to the clinic complaining of heel pain in both feet, especially with the first step in the morning. The pain is described as Sharp. The onset of the pain was unknown and has worsened over the past several weeks. Madelin Perez rates the pain as 6/10. She denies a history of trauma. Prior treatments include relates has had steroid injection in the past for plantar fasciitis, she feels it did not help. Has minor pain as well, points to posterior tibial tendon distal to medial malleolus and to insertion at navicular tuberosity.     Review of Systems   Constitutional: Negative for chills, decreased appetite, diaphoresis and fever.   HENT:  Negative for congestion and hearing loss.    Cardiovascular:  Negative for chest pain, claudication, leg swelling and syncope.   Respiratory:  Negative for cough and shortness of breath.    Skin:  Negative for color change, flushing, itching, poor wound healing and rash.   Musculoskeletal:  Negative for arthritis, back pain, joint pain and joint swelling.   Gastrointestinal:  Negative for nausea and vomiting.   Neurological:  Negative for focal weakness, paresthesias and weakness.   Psychiatric/Behavioral:  Negative for altered mental status. The patient is not nervous/anxious.         Objective:     Physical Exam  Constitutional:       General: She is not in acute distress.     Appearance: She is well-developed. She is not diaphoretic.   Cardiovascular:      Comments: Dorsalis pedis and posterior tibial pulses are within normal limits. Skin temperature is within normal limits. Toes are cool to touch and feet are warm proximally. Hair growth is within normal limits. Skin is normotrophic and without hyperpigmentation. No edema noted. No spider veins or varicosities noted, bilaterally.   Musculoskeletal:         General: Tenderness present.      Comments:  "Adequate joint range of motion without pain, limitation, nor crepitation to bilateral feet and ankle joints. Muscle strength is 5/5 in all groups bilaterally.    Tenderness bilateral heel at insertion of plantar fascia to medial tuberosity of calcaneus. Diffuse tenderness to posterior tibial tendon at insertion to navicular tuberosity         Lymphadenopathy:      Comments: Negative lymphangitic streaking    Skin:     General: Skin is warm and dry.      Findings: No lesion.      Comments: Skin is warm and dry, no acute signs of infection noted. No open wounds, macerations or hyperkeratotic lesions, bilaterally.     Toenails are well trimmed and of normal morphology, bilaterally.    Neurological:      Mental Status: She is alert and oriented to person, place, and time.      Motor: No abnormal muscle tone.      Comments: Light touch within normal limits   Psychiatric:         Behavior: Behavior normal.         Thought Content: Thought content normal.         Judgment: Judgment normal.           Assessment:      Encounter Diagnoses   Name Primary?    Plantar fasciitis Yes    Posterior tibial tendinitis of right lower extremity      Plan:     Madelin Méndez" was seen today for foot problem and foot pain.    Diagnoses and all orders for this visit:    Plantar fasciitis    Posterior tibial tendinitis of right lower extremity    Other orders  -     methylPREDNISolone (MEDROL DOSEPACK) 4 mg tablet; use as directed  -     meloxicam (MOBIC) 15 MG tablet; Take 1 tablet (15 mg total) by mouth once daily.      I counseled the patient on her conditions, their implications and medical management.  Previous xray independently reviewed with patient noticing os tibiale externum, may be contributing to posterior tibial tendonitis symptoms.   RICE, NSAIDs PRN pain  The patient was advised that NSAID-type medications have two very important potential side effects: gastrointestinal irritation including hemorrhage and renal injuries. She " was asked to take the medication with food and to stop if she experiences any GI upset. I asked her to call for vomiting, abdominal pain or black/bloody stools. The patient expresses understanding of these issues and questions were answered.  Supportive tennis shoes with arch supports at all times while ambulating  Stretching and strengthening exercises dispensed. Declines PT  Declines steroid injection  Return to clinic PRN

## 2025-02-28 NOTE — PATIENT INSTRUCTIONS
[employer], arch supports. [employer]. Consider [employer]    [employer] [address] [facility]:  [address], [place] [address]

## 2025-03-17 ENCOUNTER — OFFICE VISIT (OUTPATIENT)
Dept: FAMILY MEDICINE | Facility: CLINIC | Age: 55
End: 2025-03-17
Payer: COMMERCIAL

## 2025-03-17 VITALS
TEMPERATURE: 98 F | SYSTOLIC BLOOD PRESSURE: 128 MMHG | WEIGHT: 166.69 LBS | HEART RATE: 80 BPM | OXYGEN SATURATION: 96 % | HEIGHT: 64 IN | DIASTOLIC BLOOD PRESSURE: 88 MMHG | BODY MASS INDEX: 28.46 KG/M2

## 2025-03-17 DIAGNOSIS — R03.0 ELEVATED BLOOD PRESSURE READING WITHOUT DIAGNOSIS OF HYPERTENSION: Primary | ICD-10-CM

## 2025-03-17 PROCEDURE — 1159F MED LIST DOCD IN RCRD: CPT | Mod: CPTII,S$GLB,, | Performed by: STUDENT IN AN ORGANIZED HEALTH CARE EDUCATION/TRAINING PROGRAM

## 2025-03-17 PROCEDURE — 99999 PR PBB SHADOW E&M-EST. PATIENT-LVL III: CPT | Mod: PBBFAC,,, | Performed by: STUDENT IN AN ORGANIZED HEALTH CARE EDUCATION/TRAINING PROGRAM

## 2025-03-17 PROCEDURE — 3008F BODY MASS INDEX DOCD: CPT | Mod: CPTII,S$GLB,, | Performed by: STUDENT IN AN ORGANIZED HEALTH CARE EDUCATION/TRAINING PROGRAM

## 2025-03-17 PROCEDURE — 1160F RVW MEDS BY RX/DR IN RCRD: CPT | Mod: CPTII,S$GLB,, | Performed by: STUDENT IN AN ORGANIZED HEALTH CARE EDUCATION/TRAINING PROGRAM

## 2025-03-17 PROCEDURE — 3079F DIAST BP 80-89 MM HG: CPT | Mod: CPTII,S$GLB,, | Performed by: STUDENT IN AN ORGANIZED HEALTH CARE EDUCATION/TRAINING PROGRAM

## 2025-03-17 PROCEDURE — 3074F SYST BP LT 130 MM HG: CPT | Mod: CPTII,S$GLB,, | Performed by: STUDENT IN AN ORGANIZED HEALTH CARE EDUCATION/TRAINING PROGRAM

## 2025-03-17 PROCEDURE — 99214 OFFICE O/P EST MOD 30 MIN: CPT | Mod: S$GLB,,, | Performed by: STUDENT IN AN ORGANIZED HEALTH CARE EDUCATION/TRAINING PROGRAM

## 2025-03-17 PROCEDURE — 3044F HG A1C LEVEL LT 7.0%: CPT | Mod: CPTII,S$GLB,, | Performed by: STUDENT IN AN ORGANIZED HEALTH CARE EDUCATION/TRAINING PROGRAM

## 2025-03-17 NOTE — PROGRESS NOTES
Subjective:       Patient ID: Madelin Hidalgo is a 54 y.o. female.    Chief Complaint: Follow-up (Pt here for a 2 week BP check )      Review of Systems   All other systems reviewed and are negative.       A1C:  Recent Labs   Lab 03/21/23  0943 02/27/24  0858 02/24/25  0900   Hemoglobin A1C 5.8 H 5.3 5.6     CBC:  Recent Labs   Lab 02/27/24  0858 08/26/24  0940 02/24/25  0900   WBC 6.79 7.33 7.23   RBC 3.56 L 4.49 4.44   Hemoglobin 9.8 L 13.2 13.6   Hematocrit 31.4 L 41.1 41.9   Platelets 479 H 310 326   MCV 88 92 94   MCH 27.5 29.4 30.6   MCHC 31.2 L 32.1 32.5     CMP:  Recent Labs   Lab 03/21/23  0943 02/15/24  1619 02/27/24  0858 02/24/25  0900   Glucose 104   < > 111 H 107   Calcium 8.5 L   < > 8.7 9.5   Albumin 4.3  --  4.2 3.8   Total Protein 7.6  --  7.7 8.1   Sodium 140   < > 147 H 143   Potassium 3.9   < > 3.9 4.3   CO2 29   < > 27 26   Chloride 106   < > 109 105   BUN 13   < > 13 18   Creatinine 0.45 L   < > 0.57 0.7   Alkaline Phosphatase 81  --  91 91   ALT 23  --  35 65 H   AST 25  --  31 40   Total Bilirubin 0.4  --  0.4 0.7    < > = values in this interval not displayed.     LIPIDS:  Recent Labs   Lab 03/21/23  0943 02/15/24  1619 02/27/24  0858 08/26/24  0940 02/24/25  0900   TSH 2.270   < > 1.810   < > 5.398 H   HDL 49  --  49  --  55   Cholesterol 194  --  201 H  --  243 H   Triglycerides 249 H  --  215 H  --  258 H   LDL Cholesterol 95.2  --  109.0  --  136.4   HDL/Cholesterol Ratio 25.3  --  24.4  --  22.6   Non-HDL Cholesterol 145  --  152  --  188   Total Cholesterol/HDL Ratio 4.0  --  4.1  --  4.4    < > = values in this interval not displayed.     TSH:  Recent Labs   Lab 08/26/24  0940 10/18/24  0810 02/24/25  0900   TSH 6.327 H 7.984 H 5.398 H        Objective:      Vitals:    03/17/25 1130   BP: 128/88   Pulse: 80   Temp: 98.2 °F (36.8 °C)      Physical Exam  Vitals reviewed.   Constitutional:       Appearance: Normal appearance. She is normal weight.   HENT:      Head: Normocephalic and  atraumatic.   Eyes:      Conjunctiva/sclera: Conjunctivae normal.   Cardiovascular:      Rate and Rhythm: Normal rate and regular rhythm.      Heart sounds: Normal heart sounds.   Pulmonary:      Effort: Pulmonary effort is normal.      Breath sounds: Normal breath sounds.   Abdominal:      Palpations: Abdomen is soft.      Tenderness: There is no abdominal tenderness.   Musculoskeletal:         General: Normal range of motion.      Cervical back: Normal range of motion.      Right lower leg: No edema.      Left lower leg: No edema.   Neurological:      Mental Status: She is alert. Mental status is at baseline.   Psychiatric:         Mood and Affect: Mood normal.         Behavior: Behavior normal.          Assessment:       1. Elevated blood pressure reading without diagnosis of hypertension        Plan:     Problem List Items Addressed This Visit          Cardiac/Vascular    Elevated blood pressure reading without diagnosis of hypertension - Primary    Overview   BP improved  Continue with diet changes  Recheck in 6 months with lipids           Continue current meds  RTC in 6 months with labs prior   Continue with lifestyle changes        Sarah A. Champagne [facility]   3/17/25

## 2025-03-27 RX ORDER — MELOXICAM 15 MG/1
15 TABLET ORAL
Qty: 30 TABLET | Refills: 0 | Status: SHIPPED | OUTPATIENT
Start: 2025-03-27

## 2025-04-02 ENCOUNTER — RESULTS FOLLOW-UP (OUTPATIENT)
Dept: FAMILY MEDICINE | Facility: CLINIC | Age: 55
End: 2025-04-02

## 2025-04-21 ENCOUNTER — OFFICE VISIT (OUTPATIENT)
Dept: OBSTETRICS AND GYNECOLOGY | Facility: CLINIC | Age: 55
End: 2025-04-21
Payer: COMMERCIAL

## 2025-04-21 VITALS
SYSTOLIC BLOOD PRESSURE: 128 MMHG | HEIGHT: 64 IN | HEART RATE: 75 BPM | DIASTOLIC BLOOD PRESSURE: 86 MMHG | BODY MASS INDEX: 27.89 KG/M2 | WEIGHT: 163.38 LBS

## 2025-04-21 DIAGNOSIS — N84.1 CERVICAL POLYP: ICD-10-CM

## 2025-04-21 DIAGNOSIS — N39.3 STRESS INCONTINENCE: ICD-10-CM

## 2025-04-21 DIAGNOSIS — Z01.419 ENCOUNTER FOR ANNUAL ROUTINE GYNECOLOGICAL EXAMINATION: Primary | ICD-10-CM

## 2025-04-21 DIAGNOSIS — Z87.42 HISTORY OF ABNORMAL CERVICAL PAP SMEAR: ICD-10-CM

## 2025-04-21 DIAGNOSIS — Z12.4 PAP SMEAR FOR CERVICAL CANCER SCREENING: ICD-10-CM

## 2025-04-21 DIAGNOSIS — N89.8 VAGINAL IRRITATION: ICD-10-CM

## 2025-04-21 PROCEDURE — 87624 HPV HI-RISK TYP POOLED RSLT: CPT | Performed by: OBSTETRICS & GYNECOLOGY

## 2025-04-21 PROCEDURE — 1159F MED LIST DOCD IN RCRD: CPT | Mod: CPTII,S$GLB,, | Performed by: OBSTETRICS & GYNECOLOGY

## 2025-04-21 PROCEDURE — 3074F SYST BP LT 130 MM HG: CPT | Mod: CPTII,S$GLB,, | Performed by: OBSTETRICS & GYNECOLOGY

## 2025-04-21 PROCEDURE — 88175 CYTOPATH C/V AUTO FLUID REDO: CPT | Performed by: OBSTETRICS & GYNECOLOGY

## 2025-04-21 PROCEDURE — 3044F HG A1C LEVEL LT 7.0%: CPT | Mod: CPTII,S$GLB,, | Performed by: OBSTETRICS & GYNECOLOGY

## 2025-04-21 PROCEDURE — 3079F DIAST BP 80-89 MM HG: CPT | Mod: CPTII,S$GLB,, | Performed by: OBSTETRICS & GYNECOLOGY

## 2025-04-21 PROCEDURE — 99999 PR PBB SHADOW E&M-EST. PATIENT-LVL III: CPT | Mod: PBBFAC,,, | Performed by: OBSTETRICS & GYNECOLOGY

## 2025-04-21 PROCEDURE — 81515 NFCT DS BV&VAGINITIS DNA ALG: CPT | Performed by: OBSTETRICS & GYNECOLOGY

## 2025-04-21 PROCEDURE — 3008F BODY MASS INDEX DOCD: CPT | Mod: CPTII,S$GLB,, | Performed by: OBSTETRICS & GYNECOLOGY

## 2025-04-21 PROCEDURE — 88305 TISSUE EXAM BY PATHOLOGIST: CPT | Mod: TC | Performed by: OBSTETRICS & GYNECOLOGY

## 2025-04-21 PROCEDURE — 99396 PREV VISIT EST AGE 40-64: CPT | Mod: S$GLB,,, | Performed by: OBSTETRICS & GYNECOLOGY

## 2025-04-21 NOTE — PROGRESS NOTES
Chief Complaint   Patient presents with    Annual Exam       HISTORY OF PRESENT ILLNESS:   Madelin Hidalgo is a 54 y.o. female  who presents for well woman exam.  Patient's last menstrual period was 10/28/2024 (exact date).. Saw her last cycle in October but was only 2 days of spotting and nothing since. Were very spaced out before then. She has some vaginal itching but no discharge and not now. No spotting between cycles and not heavy. Gets some hotflashes and mood fluctuations.   Declines STD testing. Also c/o leaking when coughs but just a small amount.      Past Medical History:   Diagnosis Date    Ectopic pregnancy     History of ectopic pregnancy 2018    Iron deficiency           Past Surgical History:   Procedure Laterality Date    THYROID LOBECTOMY Left 2024    Procedure: LOBECTOMY, THYROID;  Surgeon: Weston Calvillo MD;  Location: [facility] OR [address] [place] [address];  Service: ENT;  Laterality: Left;    THYROID SURGERY      unclear what kind, she is unsure           Social History     Socioeconomic History    Marital status:    Tobacco Use    Smoking status: Never    Smokeless tobacco: Never   Substance and Sexual Activity    Alcohol use: No    Drug use: No    Sexual activity: Yes     Partners: Male     Birth control/protection: None   Social History Narrative    11/3/2021: She lives with her  and two children. Her  is also a patient of mine. There are no pets at home. 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        Family History   Problem Relation Name Age of Onset    Hypertension Mother      No Known Problems Father      Esophageal cancer Paternal Grandfather      No Known Problems Sister      No Known Problems Brother      No Known Problems Brother      No Known Problems Sister      Breast cancer Neg Hx      Colon cancer Neg Hx      Ovarian cancer Neg Hx             OB History    Para Term  AB Living   6 5 3  1 2   SAB IAB Ectopic Multiple Live  "Births     1  2      # Outcome Date GA Lbr Mendoza/2nd Weight Sex Type Anes PTL Lv   6 Term            5 Term            4 Term            3 Ectopic            2 Para      Vag-Spont   ALMA   1 Para      Vag-Spont   ALMA       COMPREHENSIVE GYN HISTORY:  PAP History: Denies abnormal Paps; 2018 NILM/HPV -  Infection History: Denies STDs. Denies PID.  Benign History: Denies uterine fibroids. Denies ovarian cysts. Denies endometriosis Denies other conditions.  Cancer History: Denies cervical cancer. Denies uterine cancer or hyperplasia. Denies ovarian cancer. Denies vulvar cancer or pre-cancer. Denies vaginal cancer or pre-cancer. Denies breast cancer. Denies colon cancer.  Cycle: 16/mon/7d      ROS:  neg    /86 (Patient Position: Sitting)   Pulse 75   Ht 5' 4" (1.626 m)   Wt 74.1 kg (163 lb 5.8 oz)   LMP 10/28/2024 (Exact Date)   BMI 28.04 kg/m²     APPEARANCE: Well nourished, well developed, in no acute distress.    BREASTS: Symmetrical, no skin changes or visible lesions. No palpable masses, nipple discharge or adenopathy bilaterally.  PELVIC:   VULVA: No lesions. Normal female genitalia.  URETHRAL MEATUS: Normal size and location, no lesions, no prolapse.  URETHRA: No masses, tenderness, prolapse or scarring.  VAGINA: Moist and well rugated, no discharge, no significant cystocele or rectocele.  CERVIX: No lesions and discharge.   UTERUS:  10 week size, no change from previous, regular shape, mobile, non-tender, bladder base nontender.  ADNEXA: No masses or tenderness.  PERINEUM: Normal, some hemorrhoids present        Data Reviewed:     Lipid profile: Date:   Lab Results   Component Value Date    CHOL 243 (H) 02/24/2025    CHOL 201 (H) 02/27/2024    CHOL 194 03/21/2023     Lab Results   Component Value Date    HDL 55 02/24/2025    HDL 49 02/27/2024    HDL 49 03/21/2023     Lab Results   Component Value Date    LDLCALC 136.4 02/24/2025    LDLCALC 109.0 02/27/2024    LDLCALC 95.2 03/21/2023     Lab Results "   Component Value Date    TRIG 258 (H) 02/24/2025    TRIG 215 (H) 02/27/2024    TRIG 249 (H) 03/21/2023     Lab Results   Component Value Date    CHOLHDL 22.6 02/24/2025    CHOLHDL 24.4 02/27/2024    CHOLHDL 25.3 03/21/2023     TSH:   Lab Results   Component Value Date    TSH 5.398 (H) 02/24/2025     Colonoscopy Date: did stool test with PCP  in 2024, was told good for 3 years     No diagnosis found.      A/P 1. Routine gyn annual exam. s/p normal breast exam and MMG up to date.  Pap with HPV cotesting done today, had ASCUS on last pap smear, if normal today then repeat in 1 year. STD testing: GC/CT/trich, syphilis, HBV/HCV and HIV declined. Lipid Profile, needed every 5 years, up to date. Fasting glucose, needed every 3 years, up to date.   TSH, needed every 5 years, up to date.   Colonoscopy up to date.   2. Discussed tracking periods, would expect to be menopausal if no period 10/2025. Discussed when to come back for heavy bleeding, spotting between ect.   3. Long talk with patient regarding types of Incontinence, etiology and management options. Discussed behavior modifications, bladder training, Kegels, various medications including cost and side effects. Also discussed indications for surgical options and the pros/cons and risks of surgery. Discussed that I do not do mid urethral slings and would refer to uro-gyn. Patient would like to proceed with home exercises and let me know.   4. Discussed HRT briefly, wants to monitor for now and will let me know if changes mind  5. Affrim done for itching, not currently itching   6. Polyp removed

## 2025-04-23 LAB
ESTROGEN SERPL-MCNC: NORMAL PG/ML
INSULIN SERPL-ACNC: NORMAL U[IU]/ML
LAB AP CLINICAL INFORMATION: NORMAL
LAB AP DIAGNOSIS CATEGORY: NORMAL
LAB AP GROSS DESCRIPTION: NORMAL
LAB AP PERFORMING LOCATION(S): NORMAL
LAB AP REPORT FOOTNOTES: NORMAL

## 2025-04-25 ENCOUNTER — PATIENT MESSAGE (OUTPATIENT)
Dept: OBSTETRICS AND GYNECOLOGY | Facility: CLINIC | Age: 55
End: 2025-04-25
Payer: COMMERCIAL

## 2025-04-25 LAB
BACTERIAL VAGINOSIS DNA (OHS): NOT DETECTED
CANDIDA GLABRATA/KRUSEI DNA (OHS): NOT DETECTED
CANDIDA SPECIES DNA (OHS): NOT DETECTED
CLINICAL INFO: NORMAL
COMMENT: NORMAL
CYTO CVX: NORMAL
CYTOLOGIST CVX/VAG CYTO: NORMAL
CYTOLOGY CMNT CVX/VAG CYTO-IMP: NORMAL
DATE OF PREVIOUS PAP: NORMAL
DATE PREVIOUS BX: NORMAL
HPV I/H RISK 4 DNA CVX QL NAA+PROBE: NOT DETECTED
LMP START DATE: NORMAL
SPECIMEN SOURCE CVX/VAG CYTO: NORMAL
STAT OF ADQ CVX/VAG CYTO-IMP: NORMAL
TRICHOMONAS VAGINALIS DNA (OHS): NOT DETECTED

## 2025-04-25 NOTE — TELEPHONE ENCOUNTER
Please let her know that the polyp we removed was not cancerous or benign.     The swab we did for the itching was normal with no signs of infection. If the itching continues she should let us know so we can re-evaluate

## 2025-04-29 NOTE — TELEPHONE ENCOUNTER
# [id number] Called spoke to patient let her know her results , what provider stated if symptoms persist to come get re -evaluated.      Thanks   Ana SAINI

## 2025-07-12 ENCOUNTER — PATIENT MESSAGE (OUTPATIENT)
Dept: OTOLARYNGOLOGY | Facility: CLINIC | Age: 55
End: 2025-07-12
Payer: COMMERCIAL

## 2025-07-24 DIAGNOSIS — E07.89 THYROID MASS OF UNCLEAR ETIOLOGY: Primary | ICD-10-CM

## 2025-08-04 ENCOUNTER — OFFICE VISIT (OUTPATIENT)
Dept: OTOLARYNGOLOGY | Facility: CLINIC | Age: 55
End: 2025-08-04
Payer: COMMERCIAL

## 2025-08-04 VITALS
SYSTOLIC BLOOD PRESSURE: 128 MMHG | WEIGHT: 166.69 LBS | DIASTOLIC BLOOD PRESSURE: 83 MMHG | BODY MASS INDEX: 28.61 KG/M2

## 2025-08-04 DIAGNOSIS — E07.89 THYROID MASS OF UNCLEAR ETIOLOGY: Primary | ICD-10-CM

## 2025-08-04 PROCEDURE — 99213 OFFICE O/P EST LOW 20 MIN: CPT | Mod: S$GLB,,, | Performed by: OTOLARYNGOLOGY

## 2025-08-04 PROCEDURE — 3074F SYST BP LT 130 MM HG: CPT | Mod: CPTII,S$GLB,, | Performed by: OTOLARYNGOLOGY

## 2025-08-04 PROCEDURE — 3044F HG A1C LEVEL LT 7.0%: CPT | Mod: CPTII,S$GLB,, | Performed by: OTOLARYNGOLOGY

## 2025-08-04 PROCEDURE — 99999 PR PBB SHADOW E&M-EST. PATIENT-LVL III: CPT | Mod: PBBFAC,,, | Performed by: OTOLARYNGOLOGY

## 2025-08-04 PROCEDURE — 1159F MED LIST DOCD IN RCRD: CPT | Mod: CPTII,S$GLB,, | Performed by: OTOLARYNGOLOGY

## 2025-08-04 PROCEDURE — 3008F BODY MASS INDEX DOCD: CPT | Mod: CPTII,S$GLB,, | Performed by: OTOLARYNGOLOGY

## 2025-08-04 PROCEDURE — 3079F DIAST BP 80-89 MM HG: CPT | Mod: CPTII,S$GLB,, | Performed by: OTOLARYNGOLOGY

## 2025-08-04 PROCEDURE — 1160F RVW MEDS BY RX/DR IN RCRD: CPT | Mod: CPTII,S$GLB,, | Performed by: OTOLARYNGOLOGY

## 2025-08-04 NOTE — PROGRESS NOTES
History of Present Illness:   Madelin Hidalgo is a 54 y.o. year old female evaluated in the Otolaryngology-Head and Neck Surgery Clinic at [facility].  Patient returns s/p hemithyroidectomy 04/25/2024 for benign adenomatous nodule.  She had some lab work done in [place] and daughter was concerned about regrowth of mass on her right thyroid.  Of note she had a previous partial lobectomy or nodule removal on the right.  When inquired further was really TSH that was abnormal.  There was no imaging done there.  She presents after repeat ultrasound recently done.      Initially referred for evaluation of thyroid nodule with FNA showing atypia of undetermined significance.  Patient has a known history of thyroid problems with surgery in [place] about 10 years ago with excision of what she refers to as a cyst.  This was from a mini incision.  She denies any family history of thyroid carcinoma.  She denies any personal history of radiation.  She does not feel like the area has grown significantly.  She underwent evaluation by Dr. Rebollar and was found to have atypia of undetermined significance on FNA.  Of note interviewed done with 2 different interpreters below with ID numbers  Edgar ID number [id number]  Haven ID# [id number]            Past Medical/Surgical History  Past Medical History:   Diagnosis Date    Ectopic pregnancy     History of ectopic pregnancy 6/25/2018    Iron deficiency      Her  has a past surgical history that includes Thyroid surgery and Thyroid lobectomy (Left, 4/25/2024).     Past Family/Social History  Her family history includes Esophageal cancer in her paternal grandfather; Hypertension in her mother; No Known Problems in her brother, brother, father, sister, and sister.  She  reports that she has never smoked. She has never used smokeless tobacco. She reports that she does not drink alcohol and does not use drugs.     Medications/Allergies/Immunizations  Her current medication(s) include:    Current Outpatient Medications   Medication Sig Dispense Refill    ascorbic acid, vitamin C, (VITAMIN C) 100 MG tablet Take 100 mg by mouth once daily.      cetirizine (ZYRTEC) 10 MG tablet Take 1 tablet (10 mg total) by mouth once daily. 90 tablet 1    levothyroxine (SYNTHROID) 50 MCG tablet Take 1 tablet (50 mcg total) by mouth before breakfast. 90 tablet 3    meloxicam (MOBIC) 15 MG tablet TAKE 1 TABLET BY MOUTH EVERY DAY 30 tablet 0     No current facility-administered medications for this visit.        Allergies: Patient has no known allergies.     Immunizations:   Immunization History   Administered Date(s) Administered    COVID-19, MRNA, LN-S, PF (Pfizer) (Purple Cap) 03/23/2021, 04/20/2021, 11/13/2021    Hepatitis B (recombinant) Adjuvanted, 2 dose 07/09/2024, 08/08/2024    Influenza - Quadrivalent - MDCK - PF 10/12/2019, 10/24/2020, 10/03/2021    Pneumococcal Conjugate - 20 Valent 02/27/2025    Tdap 06/25/2018, 10/24/2020         Review of Systems   Constitutional: Negative for fever, weight loss and weight gain.  Skin: Negative for rash, itchiness, dryness  HENT:  As per HPI  Cardiovascular: Negative for chest pain and dyspnea on exertion .   Respiratory: Is not experiencing shortness of breath.   Gastrointestinal: Negative for nausea and vomiting.   Neurological: Negative for headaches.   Lymph/Heme: Negative for lymphadenopathy or easy bruising  Musculoskeletal: Negative for joint or muscle pain  Psychiatric: The patient is not nervous/anxious.        All other systems are negative except for that listed in the HPI.      PHYSICAL EXAM:   Vital Signs:  /83   Wt 75.6 kg (166 lb 10.7 oz)   BMI 28.61 kg/m²      General:  Well-developed, well-nourished  Communication and Voice:  Clear pitch and clarity  Hearing: Hearing adequate for verbal communication bilaterally   Inspection:  Normocephalic and atraumatic without mass or lesion  Palpation:  Facial skeleton intact without bony stepoffs  Parotid  Glands:  No mass or tenderness  Facial Strength:  Facial motility symmetric and full bilaterally  Pinna:  External ear intact and fully developed  External canal:  Canal is patent with intact skin  Tympanic Membrane:  Clear and mobile  External nose:  No scar or anatomic deformity  Internal Nose:  Septum intact and midline.  No edema, polyp, or rhinorrhea.  TMJ:  No pain to palpation with full mobility  Oral cavity, Lips, Teeth, and Gums:  Mucosa and teeth intact and viable, No lesions, masses or ulcers  Oropharynx: No erythema or exudate, no masses or ulcerations, non-obstructive tonsils  Nasopharynx:  No mass or lesion with intact mucosa  Hypopharynx:  Not well visualized secondary to gagging  Larynx:  Not well visualized secondary to gagging  Neck, Trachea, Lymphatics:  Midline trachea without mass or lesion, no lymphadenopathy thyroidectomy incision well healed  Thyroid:  Left thyroid surgically absent right thyroid without any palpable nodules  Eyes: No nystagmus with equal extraocular motion bilaterally  Neuro/Psych/Balance: Patient oriented and appropriate in interaction;  Appropriate mood and affect;  Gait is intact with no imbalance; Cranial nerves I-XII are intact  Respiratory effort:  Equal inspiration and expiration without stridor  Peripheral Vascular:  Warm extremities with equal pulses     Procedure: Flexible fiberoptic nasopharyngoscopy/laryngoscopy   Indications: Need for detailed exam, hyperactive gag reflex, inadequate mirror visualization.  Surgeon: Weston Calvillo MD  Procedure note/findings: After informed discussion of the risks, benefits, and alternatives after indications as noted above, a fiberoptic nasopharyngoscopy and laryngoscopy was recommended for above indications, and the patient consented to this. Nasal cavities were topically anesthetized and decongested with 4% lidocaine and Afrin solutions after which a flexible scope was easily advanced without difficulty into the left and right  nasal cavities as well as into the nasopharynx. Nasal cavities were intact without gross mass or lesion. Nasopharynx, similarly, revealed no mass lesion or granularity. There was no ulceration. There was no gross asymmetry. Fossa of Rosenmueller was intact bilaterally. The eustachian tube orifices were intact bilaterally and patent. Posterior and lateral nasal pharyngeal walls were intact and symmetric without ulceration, mass, or granularity. Scope was now advanced distally. Visible oropharynx including lateral walls and tongue base was clear without lesion. Larynx and hypopharynx were examined. Larynx was intact with normal true vocal cord mobility bilaterally. No discrete masses or lesions in any location. Hypopharynx was clear without asymmetry.  Airway was patent. The scope was then withdrawn and removed. She tolerated this well.           PATHOLOGY REVIEW:    Left thyroid lobectomy 04/25/2024  1. Lymph node, Delphian, excision:   - One lymph node, negative for malignancy (0/1)     2. Thyroid, left lobe, hemithyroidectomy:   - Adenomatoid nodule with changes consistent with previous aspiration site     RADIOLOGIC REVIEW:   Ultrasound thyroid 07/28/2025       Impression:     Left thyroidectomy.     6 mm right thyroid lobe nodule.  Follow-up recommended in 1 year.           ASSESSMENT:   1. Thyroid mass of unclear etiology       PLAN:   I reassured the daughter and the patient that she has no significant remaining nodules on the right she has a 6 mm nonsignificant nodule that does not even warrant repeat ultrasound or follow up.  She could get an ultrasound in 2 years if she wanted for her reassurance.  No further workup needed follow up PRN    I believe that Ms. Hidalgo has a good understanding of the issues involved and I answered all of her questions.     DISCLAIMER: This note was prepared with [employer] voice recognition transcription software. Garbled syntax, mangled pronouns, and other bizarre constructions may  be attributed to that software system. While efforts were made to correct any mistakes made by this voice recognition program, some errors and/or omissions may remain in the note that were missed when the note was originally created.

## (undated) DEVICE — DRAPE EENT SPLIT STERILE

## (undated) DEVICE — ELECTRODE REM PLYHSV RETURN 9

## (undated) DEVICE — GOWN SURGICAL X-LARGE

## (undated) DEVICE — TRAY MINOR GEN SURG OMC

## (undated) DEVICE — TRAY SKIN SCRUB WET PREMIUM

## (undated) DEVICE — STAPLER SKIN PROXIMATE WIDE

## (undated) DEVICE — NDL HYPO REG 25G X 1 1/2

## (undated) DEVICE — DRESSING TRANS 4X4 TEGADERM

## (undated) DEVICE — TOWEL OR DISP STRL BLUE 4/PK

## (undated) DEVICE — CORD BIPOLAR 12 FOOT

## (undated) DEVICE — ELECTRODE BLADE INSULATED 1 IN

## (undated) DEVICE — SUT 2-0 12-18IN SILK

## (undated) DEVICE — CONTAINER SPECIMEN OR STER 4OZ

## (undated) DEVICE — GAUZE SPONGE PEANUT STRL

## (undated) DEVICE — GOWN FAB REINF SET-IN SLV 2XL

## (undated) DEVICE — SUT 5/0 18IN PLAIN FAST AB

## (undated) DEVICE — MARKER FN REG DUAL UTIL RULER

## (undated) DEVICE — DRAPE CORETEMP FLD WRM 56X62IN

## (undated) DEVICE — EVACUATOR WOUND BULB 100CC

## (undated) DEVICE — BLADE SURG #15 CARBON STEEL

## (undated) DEVICE — SUT SILK 2-0 PS 18IN BLACK

## (undated) DEVICE — DRAPE INSTR MAGNETIC 10X16IN

## (undated) DEVICE — CLIP MED TICALL

## (undated) DEVICE — SUT CTD VICRYL 3-0 CR/SH

## (undated) DEVICE — SUT 3-0 12-18IN SILK

## (undated) DEVICE — Device

## (undated) DEVICE — SUT LIGACLIP SMALL XTRA

## (undated) DEVICE — SPONGE LAP 18X18 PREWASHED

## (undated) DEVICE — DRAPE HALF SURGICAL 40X58IN